# Patient Record
Sex: FEMALE | Race: WHITE | Employment: FULL TIME | URBAN - METROPOLITAN AREA
[De-identification: names, ages, dates, MRNs, and addresses within clinical notes are randomized per-mention and may not be internally consistent; named-entity substitution may affect disease eponyms.]

---

## 2017-02-15 ENCOUNTER — OFFICE VISIT (OUTPATIENT)
Dept: FAMILY MEDICINE | Facility: CLINIC | Age: 37
End: 2017-02-15
Payer: COMMERCIAL

## 2017-02-15 VITALS
BODY MASS INDEX: 29.71 KG/M2 | OXYGEN SATURATION: 95 % | DIASTOLIC BLOOD PRESSURE: 75 MMHG | WEIGHT: 174 LBS | HEIGHT: 64 IN | SYSTOLIC BLOOD PRESSURE: 102 MMHG | HEART RATE: 80 BPM

## 2017-02-15 DIAGNOSIS — N64.4 BREAST TENDERNESS: ICD-10-CM

## 2017-02-15 DIAGNOSIS — F98.8 ATTENTION DEFICIT DISORDER: Primary | ICD-10-CM

## 2017-02-15 DIAGNOSIS — L30.9 ECZEMA, UNSPECIFIED TYPE: ICD-10-CM

## 2017-02-15 PROCEDURE — 99203 OFFICE O/P NEW LOW 30 MIN: CPT | Performed by: INTERNAL MEDICINE

## 2017-02-15 RX ORDER — DEXTROAMPHETAMINE SACCHARATE, AMPHETAMINE ASPARTATE MONOHYDRATE, DEXTROAMPHETAMINE SULFATE AND AMPHETAMINE SULFATE 5; 5; 5; 5 MG/1; MG/1; MG/1; MG/1
20 CAPSULE, EXTENDED RELEASE ORAL DAILY
Qty: 30 CAPSULE | Refills: 0 | Status: SHIPPED | OUTPATIENT
Start: 2017-02-15 | End: 2017-03-28

## 2017-02-15 RX ORDER — DEXTROAMPHETAMINE SACCHARATE, AMPHETAMINE ASPARTATE, DEXTROAMPHETAMINE SULFATE AND AMPHETAMINE SULFATE 2.5; 2.5; 2.5; 2.5 MG/1; MG/1; MG/1; MG/1
TABLET ORAL
Qty: 30 TABLET | Refills: 0 | Status: SHIPPED | OUTPATIENT
Start: 2017-02-15 | End: 2017-03-28

## 2017-02-15 RX ORDER — NYSTATIN AND TRIAMCINOLONE ACETONIDE 100000; 1 [USP'U]/G; MG/G
CREAM TOPICAL 2 TIMES DAILY
Qty: 15 G | Refills: 1 | Status: SHIPPED | OUTPATIENT
Start: 2017-02-15 | End: 2017-06-06

## 2017-02-15 ASSESSMENT — ANXIETY QUESTIONNAIRES
6. BECOMING EASILY ANNOYED OR IRRITABLE: NOT AT ALL
1. FEELING NERVOUS, ANXIOUS, OR ON EDGE: NOT AT ALL
3. WORRYING TOO MUCH ABOUT DIFFERENT THINGS: NOT AT ALL
7. FEELING AFRAID AS IF SOMETHING AWFUL MIGHT HAPPEN: NOT AT ALL
5. BEING SO RESTLESS THAT IT IS HARD TO SIT STILL: NOT AT ALL
GAD7 TOTAL SCORE: 0
2. NOT BEING ABLE TO STOP OR CONTROL WORRYING: NOT AT ALL

## 2017-02-15 ASSESSMENT — ENCOUNTER SYMPTOMS
NERVOUS/ANXIOUS: 0
TREMORS: 0
PALPITATIONS: 0
COUGH: 0
SHORTNESS OF BREATH: 0

## 2017-02-15 ASSESSMENT — PATIENT HEALTH QUESTIONNAIRE - PHQ9: 5. POOR APPETITE OR OVEREATING: NOT AT ALL

## 2017-02-15 NOTE — LETTER
[facility]    02/15/17    Patient: Nicki Frey  YOB: 1980  Medical Record Number: [id number]                                                                  Controlled Substance Agreement  I understand that my care provider has prescribed controlled substances (narcotics, tranquilizers, and/or stimulants) to help manage my condition(s).  I am taking this medicine to help me function or work.  I know that this is strong medicine.  It could have serious side effects and even cause a dependency on the drug.  If I stop these medicines suddenly, I could have severe withdrawal symptoms.    The risks, benefits, and side effects of these medication(s) were explained to me.  I agree that:  1. I will take part in other treatments as advised by my provider.  This may be psychiatry or counseling, physical therapy, behavioral therapy, group treatment, or a referral to a pain clinic.  I will reduce or stop my medicine when my provider tells me to do so.   2. I will take my medicines as prescribed.  I will not change the dose or schedule unless my provider tells me to.  There will be no refills if I  run out early.   I may be contacted at any time without warning and asked to complete a drug test or pill count.   3. I will keep all my appointments at the clinic.  If I miss appointments or fail to follow instructions, my provider may stop my medicine.  4. I will not ask other providers to prescribe controlled substances. And I will not accept controlled substances from other people. If I need another prescribed controlled substance for a new reason, I will notify my provider within one business day.  5. If I enroll in the [place] Medical Marijuana program, I will tell my provider.  I will also sign an agreement to share my medical records with my provider.  6. I will use one pharmacy to fill all of my controlled substance prescriptions.  If my prescription is mailed to my pharmacy, it may take 5 to 7  days for my medicine to be ready.  7. I understand that my provider, clinic care team, and pharmacy can track controlled substance prescriptions from other providers through a central database (prescription monitoring program).  8. I will bring in my list of medications (or my medicine bottles) each time I come to the clinic.  REV-  04/2016                                                                                                                                            Page 1 of 2      [facility]    02/15/17    Patient: Nicki Frey  YOB: 1980  Medical Record Number: [id number]    9. Refills of controlled substances will be made only during office hours.  It is up to me to make sure that I do not run out of my medicines on weekends or holidays.    10. I am responsible for my prescriptions.  If the medicine is lost or stolen, it will not be replaced.   I also agree not to share these medicines with anyone.  11. I agree to not use ANY illegal or recreational drugs.  This includes marijuana, cocaine, bath salts or other drugs.  I agree not to use alcohol unless my provider says I may.  I agree to give urine samples whenever asked.  If I fail to give a urine sample, the provider may stop my medicine.     12. I will tell my nurse or provider right away if I become pregnant or have a new medical problem treated outside of [facility].  13. I understand that this medicine can affect my thinking and judgment.  It may be unsafe for me to drive, use machinery and do dangerous tasks.  I will not do any of these things until I know how the medicine affects me.  If my dose changes, I will wait to see how it affects me.  I will contact my provider if I have concerns about medicine side effects.  I understand that if I do not follow any of the conditions above, my prescriptions or treatment may be stopped.    I agree that my provider, clinic care team, and pharmacy may work with any city,  state or federal law enforcement agency that investigates the misuse, sale, or other diversion of my controlled medicine. I will allow my provider to discuss my care with or share a copy of this agreement with any other treating provider, pharmacy or emergency room where I receive care.  I agree to give up (waive) any right of privacy or confidentiality with respect to these authorizations.   I have read this agreement and have asked questions about anything I did not understand.   ___________________________________    ___________________________  Patient Signature                                                           Date and Time  ___________________________________     ____________________________  Witness                                                                            Date and Time  ___________________________________  Randall Walter Jason MD  REV-  04/2016                                                                                                                                                                 Page 2 of 2

## 2017-02-15 NOTE — MR AVS SNAPSHOT
"              After Visit Summary   2/15/2017    Nicki Frey    MRN: [id number]           Patient Information     Date Of Birth          1980        Visit Information        Provider Department      2/15/2017 3:30 PM Randall Jason MD [facility]        Today's Diagnoses     Attention deficit disorder    -  1    Eczema, unspecified type        Breast tenderness          Care Instructions    Call doctor if your skin problem persist/worsens, or if you develop new symptoms.    Proceed with mammogram.    Follow up in 1 month.          Follow-ups after your visit        Future tests that were ordered for you today     Open Future Orders        Priority Expected Expires Ordered    MA Diagnostic Digital Bilateral Routine  2/15/2018 2/15/2017            Who to contact     If you have questions or need follow up information about today's clinic visit or your schedule please contact [facility] directly at [phone].  Normal or non-critical lab and imaging results will be communicated to you by MyChart, letter or phone within 4 business days after the clinic has received the results. If you do not hear from us within 7 days, please contact the clinic through MyChart or phone. If you have a critical or abnormal lab result, we will notify you by phone as soon as possible.  Submit refill requests through [employer] or call your pharmacy and they will forward the refill request to us. Please allow 3 business days for your refill to be completed.          Additional Information About Your Visit        MyChart Information     [employer] lets you send messages to your doctor, view your test results, renew your prescriptions, schedule appointments and more. To sign up, go to [email or web address] . Click on \"Log in\" on the left side of the screen, which will take you to the Welcome page. Then click on \"Sign up Now\" on the right side of the page.     You will be asked to enter the " "access code listed below, as well as some personal information. Please follow the directions to create your username and password.     Your access code is: [email or web address]  Expires: 2017  4:30 PM     Your access code will  in 90 days. If you need help or a new code, please call your [facility] clinic or [phone].        Care EveryWhere ID     This is your Care EveryWhere ID. This could be used by other organizations to access your [facility] medical records  [id number]        Your Vitals Were     Pulse Height Last Period Pulse Oximetry Breastfeeding? BMI (Body Mass Index)    80 5' 4\" (1.626 m) 02/10/2017 95% No 29.87 kg/m2       Blood Pressure from Last 3 Encounters:   02/15/17 102/75    Weight from Last 3 Encounters:   02/15/17 174 lb (78.9 kg)                 Today's Medication Changes          These changes are accurate as of: 2/15/17  4:30 PM.  If you have any questions, ask your nurse or doctor.               Start taking these medicines.        Dose/Directions    * amphetamine-dextroamphetamine 20 MG per 24 hr capsule   Commonly known as:  ADDERALL XR   Used for:  Attention deficit disorder   Started by:  Randall Jason MD        Dose:  20 mg   Take 1 capsule (20 mg) by mouth daily   Quantity:  30 capsule   Refills:  0       * amphetamine-dextroamphetamine 10 MG per tablet   Commonly known as:  ADDERALL   Used for:  Attention deficit disorder   Started by:  Randall Jason MD        May take 1 tablet in the afternoon as needed   Quantity:  30 tablet   Refills:  0       nystatin-triamcinolone cream   Commonly known as:  MYCOLOG II   Used for:  Eczema, unspecified type   Started by:  Randall Jason MD        Apply topically 2 times daily Do not use continuously more than 1 week   Quantity:  15 g   Refills:  1       * Notice:  This list has 2 medication(s) that are the same as other medications prescribed for you. Read the directions carefully, and " ask your doctor or other care provider to review them with you.         Where to get your medicines      These medications were sent to [facility]/pharmacy #[id number] - [place], [place] - [address] AT [place] [address]  [address], [place] [address]     Phone:  [phone]     nystatin-triamcinolone cream         Some of these will need a paper prescription and others can be bought over the counter.  Ask your nurse if you have questions.     Bring a paper prescription for each of these medications     amphetamine-dextroamphetamine 10 MG per tablet    amphetamine-dextroamphetamine 20 MG per 24 hr capsule                Primary Care Provider Office Phone # Fax #    Randall Walter Jason [phone]       [facility] [address] [facility] [address]  [facility] [address]        Thank you!     Thank you for choosing [facility]  for your care. Our goal is always to provide you with excellent care. Hearing back from our patients is one way we can continue to improve our services. Please take a few minutes to complete the written survey that you may receive in the mail after your visit with us. Thank you!             Your Updated Medication List - Protect others around you: Learn how to safely use, store and throw away your medicines at www.disposemymeds.org.          This list is accurate as of: 2/15/17  4:30 PM.  Always use your most recent med list.                   Brand Name Dispense Instructions for use    * amphetamine-dextroamphetamine 20 MG per 24 hr capsule    ADDERALL XR    30 capsule    Take 1 capsule (20 mg) by mouth daily       * amphetamine-dextroamphetamine 10 MG per tablet    ADDERALL    30 tablet    May take 1 tablet in the afternoon as needed       nystatin-triamcinolone cream    MYCOLOG II    15 g    Apply topically 2 times daily Do not use continuously more than 1 week       * Notice:  This list has 2 medication(s) that are the same as other medications  prescribed for you. Read the directions carefully, and ask your doctor or other care provider to review them with you.

## 2017-02-15 NOTE — PROGRESS NOTES
"HPI Comments:   Patient comes in today to establish care and discuss certain concerns.      For the past couple of weeks, the patient has been noticing a scaly, mildly erythematous and mildly pruritic rash at the left breast which is recurrent and spontaneously resolves.  No associated pain.    Also reports tenderness at the left breast. Has not noticed any palpable masses. No nipple discharge.    She has a history of attention deficit disorder and used to be on a regimen of extended-release Adderall with as-needed regular-release Adderall late in the afternoon. She does not recall the doses of her Adderall.      Past Medical History   Diagnosis Date     Attention deficit disorder        Review of Systems   Constitutional: Negative for malaise/fatigue.   Respiratory: Negative for cough and shortness of breath.    Cardiovascular: Negative for chest pain and palpitations.   Neurological: Negative for tremors.   Psychiatric/Behavioral: The patient is not nervous/anxious.        /75  Pulse 80  Ht 5' 4\" (1.626 m)  Wt 174 lb (78.9 kg)  LMP 02/10/2017  SpO2 95%  Breastfeeding? No  BMI 29.87 kg/m2      Physical Exam   Constitutional: She is oriented to person, place, and time. No distress.   Neck: No thyromegaly present.   Cardiovascular: Normal rate, regular rhythm and normal heart sounds.    Pulmonary/Chest: Effort normal and breath sounds normal. No respiratory distress. Right breast exhibits no mass, no nipple discharge and no tenderness. Left breast exhibits tenderness (at lower half). Left breast exhibits no mass and no nipple discharge.   Lymphadenopathy:     She has no cervical adenopathy.        Right axillary: No pectoral and no lateral adenopathy present.        Left axillary: No pectoral and no lateral adenopathy present.       Right: No supraclavicular adenopathy present.        Left: No supraclavicular adenopathy present.   Neurological: She is alert and oriented to person, place, and time. GCS score is " 15.   Skin: Rash (Mildly erythematous and scaly patch at the left breast) noted.   Psychiatric: Mood and affect normal.   Vitals reviewed.        ICD-10-CM    1. Attention deficit disorder F98.8 amphetamine-dextroamphetamine (ADDERALL XR) 20 MG per 24 hr capsule     amphetamine-dextroamphetamine (ADDERALL) 10 MG per tablet   2. Eczema, unspecified type L30.9 nystatin-triamcinolone (MYCOLOG II) cream   3. Breast tenderness N64.4 MA Diagnostic Digital Bilateral         Patient Instructions   Call doctor if your skin problem persist/worsens, or if you develop new symptoms.    Proceed with mammogram.    Follow up in 1 month.

## 2017-02-15 NOTE — PATIENT INSTRUCTIONS
Call doctor if your skin problem persist/worsens, or if you develop new symptoms.    Proceed with mammogram.    Follow up in 1 month.

## 2017-02-16 ASSESSMENT — ANXIETY QUESTIONNAIRES: GAD7 TOTAL SCORE: 0

## 2017-02-28 ENCOUNTER — HOSPITAL ENCOUNTER (OUTPATIENT)
Dept: MAMMOGRAPHY | Facility: CLINIC | Age: 37
Discharge: HOME | End: 2017-02-28
Attending: INTERNAL MEDICINE | Admitting: INTERNAL MEDICINE
Payer: COMMERCIAL

## 2017-02-28 ENCOUNTER — HOSPITAL ENCOUNTER (OUTPATIENT)
Dept: MAMMOGRAPHY | Facility: CLINIC | Age: 37
End: 2017-02-28
Attending: INTERNAL MEDICINE
Payer: COMMERCIAL

## 2017-02-28 DIAGNOSIS — N64.4 BREAST TENDERNESS: ICD-10-CM

## 2017-02-28 PROCEDURE — 76642 ULTRASOUND BREAST LIMITED: CPT | Mod: LT

## 2017-02-28 PROCEDURE — G0204 DX MAMMO INCL CAD BI: HCPCS

## 2017-03-28 ENCOUNTER — OFFICE VISIT (OUTPATIENT)
Dept: FAMILY MEDICINE | Facility: CLINIC | Age: 37
End: 2017-03-28
Payer: COMMERCIAL

## 2017-03-28 VITALS
WEIGHT: 169.9 LBS | SYSTOLIC BLOOD PRESSURE: 112 MMHG | OXYGEN SATURATION: 97 % | HEART RATE: 68 BPM | TEMPERATURE: 98.9 F | HEIGHT: 64 IN | DIASTOLIC BLOOD PRESSURE: 73 MMHG | BODY MASS INDEX: 29.01 KG/M2

## 2017-03-28 DIAGNOSIS — F33.0 MILD EPISODE OF RECURRENT MAJOR DEPRESSIVE DISORDER (H): Primary | ICD-10-CM

## 2017-03-28 DIAGNOSIS — F98.8 ATTENTION DEFICIT DISORDER: ICD-10-CM

## 2017-03-28 PROCEDURE — 99214 OFFICE O/P EST MOD 30 MIN: CPT | Performed by: INTERNAL MEDICINE

## 2017-03-28 PROCEDURE — 84443 ASSAY THYROID STIM HORMONE: CPT | Performed by: INTERNAL MEDICINE

## 2017-03-28 PROCEDURE — 36415 COLL VENOUS BLD VENIPUNCTURE: CPT | Performed by: INTERNAL MEDICINE

## 2017-03-28 RX ORDER — CITALOPRAM HYDROBROMIDE 20 MG/1
20 TABLET ORAL DAILY
Qty: 30 TABLET | Refills: 1 | Status: SHIPPED | OUTPATIENT
Start: 2017-03-28 | End: 2018-02-13

## 2017-03-28 RX ORDER — ACETAMINOPHEN 500 MG
1000 TABLET ORAL
COMMUNITY
End: 2018-11-08

## 2017-03-28 RX ORDER — COVID-19 ANTIGEN TEST
KIT MISCELLANEOUS
COMMUNITY
End: 2022-03-04

## 2017-03-28 RX ORDER — DEXTROAMPHETAMINE SACCHARATE, AMPHETAMINE ASPARTATE MONOHYDRATE, DEXTROAMPHETAMINE SULFATE AND AMPHETAMINE SULFATE 5; 5; 5; 5 MG/1; MG/1; MG/1; MG/1
20 CAPSULE, EXTENDED RELEASE ORAL DAILY
Qty: 30 CAPSULE | Refills: 0 | Status: SHIPPED | OUTPATIENT
Start: 2017-03-28 | End: 2017-05-17

## 2017-03-28 RX ORDER — DEXTROAMPHETAMINE SACCHARATE, AMPHETAMINE ASPARTATE, DEXTROAMPHETAMINE SULFATE AND AMPHETAMINE SULFATE 2.5; 2.5; 2.5; 2.5 MG/1; MG/1; MG/1; MG/1
TABLET ORAL
Qty: 30 TABLET | Refills: 0 | Status: SHIPPED | OUTPATIENT
Start: 2017-03-28 | End: 2017-05-17

## 2017-03-28 RX ORDER — ESCITALOPRAM OXALATE 10 MG/1
10 TABLET ORAL DAILY
Qty: 30 TABLET | Refills: 1 | Status: SHIPPED | OUTPATIENT
Start: 2017-03-28 | End: 2018-02-13

## 2017-03-28 NOTE — NURSING NOTE
"Chief Complaint   Patient presents with     Recheck Medication     Adderall       Initial /73 (BP Location: Left arm, Patient Position: Chair, Cuff Size: Adult Regular)  Pulse 68  Temp 98.9  F (37.2  C) (Oral)  Ht 5' 4\" (1.626 m)  Wt 169 lb 14.4 oz (77.1 kg)  SpO2 97%  Breastfeeding? No  BMI 29.16 kg/m2 Estimated body mass index is 29.16 kg/(m^2) as calculated from the following:    Height as of this encounter: 5' 4\" (1.626 m).    Weight as of this encounter: 169 lb 14.4 oz (77.1 kg).  Medication Reconciliation: complete     LORETTA Ravi MA March 28, 2017 4:06 PM      "

## 2017-03-28 NOTE — MR AVS SNAPSHOT
"              After Visit Summary   3/28/2017    Nicki Frey    MRN: [id number]           Patient Information     Date Of Birth          1980        Visit Information        Provider Department      3/28/2017 4:00 PM Randall Jason MD [facility]        Today's Diagnoses     Mild episode of recurrent major depressive disorder (H)    -  1    Attention deficit disorder          Care Instructions    Start first with Celexa, and if not tolerated then may try Lexapro.    Call doctor if you develop any side effects from the medication.    Follow up in 1 month.          Follow-ups after your visit        Who to contact     If you have questions or need follow up information about today's clinic visit or your schedule please contact [facility] directly at [phone].  Normal or non-critical lab and imaging results will be communicated to you by MyChart, letter or phone within 4 business days after the clinic has received the results. If you do not hear from us within 7 days, please contact the clinic through MyChart or phone. If you have a critical or abnormal lab result, we will notify you by phone as soon as possible.  Submit refill requests through [employer] or call your pharmacy and they will forward the refill request to us. Please allow 3 business days for your refill to be completed.          Additional Information About Your Visit        [employer] Information     [employer] lets you send messages to your doctor, view your test results, renew your prescriptions, schedule appointments and more. To sign up, go to [email or web address] . Click on \"Log in\" on the left side of the screen, which will take you to the Welcome page. Then click on \"Sign up Now\" on the right side of the page.     You will be asked to enter the access code listed below, as well as some personal information. Please follow the directions to create your username and password.     Your access code is: " [email or web address]  Expires: 2017  5:30 PM     Your access code will  in 90 days. If you need help or a new code, please call your [facility] clinic or [phone].        Care EveryWhere ID     This is your Care EveryWhere ID. This could be used by other organizations to access your [facility] medical records  [id number]        Your Vitals Were     Pulse Temperature Height Pulse Oximetry Breastfeeding? BMI (Body Mass Index)    68 98.9  F (37.2  C) (Oral) 5' 4\" (1.626 m) 97% No 29.16 kg/m2       Blood Pressure from Last 3 Encounters:   17 112/73   02/15/17 102/75    Weight from Last 3 Encounters:   17 169 lb 14.4 oz (77.1 kg)   02/15/17 174 lb (78.9 kg)              Today, you had the following     No orders found for display         Today's Medication Changes          These changes are accurate as of: 3/28/17  4:33 PM.  If you have any questions, ask your nurse or doctor.               Start taking these medicines.        Dose/Directions    citalopram 20 MG tablet   Commonly known as:  celeXA   Used for:  Mild episode of recurrent major depressive disorder (H)   Started by:  Randall Jason MD        Dose:  20 mg   Take 1 tablet (20 mg) by mouth daily Take half a tablet daily for the first week   Quantity:  30 tablet   Refills:  1       escitalopram 10 MG tablet   Commonly known as:  LEXAPRO   Used for:  Mild episode of recurrent major depressive disorder (H)   Started by:  Randall Jason MD        Dose:  10 mg   Take 1 tablet (10 mg) by mouth daily Take half a tablet once a day for the first week   Quantity:  30 tablet   Refills:  1            Where to get your medicines      Some of these will need a paper prescription and others can be bought over the counter.  Ask your nurse if you have questions.     Bring a paper prescription for each of these medications     amphetamine-dextroamphetamine 10 MG per tablet    amphetamine-dextroamphetamine 20 MG per 24 hr " capsule    citalopram 20 MG tablet    escitalopram 10 MG tablet                Primary Care Provider Office Phone # Fax #    Randall Walter Jason [phone]       [facility] [address] [facility] [address]  [facility] [address]        Thank you!     Thank you for choosing [facility]  for your care. Our goal is always to provide you with excellent care. Hearing back from our patients is one way we can continue to improve our services. Please take a few minutes to complete the written survey that you may receive in the mail after your visit with us. Thank you!             Your Updated Medication List - Protect others around you: Learn how to safely use, store and throw away your medicines at www.disposemymeds.org.          This list is accurate as of: 3/28/17  4:33 PM.  Always use your most recent med list.                   Brand Name Dispense Instructions for use    acetaminophen 500 MG tablet    TYLENOL     Take 1,000 mg by mouth       * amphetamine-dextroamphetamine 20 MG per 24 hr capsule    ADDERALL XR    30 capsule    Take 1 capsule (20 mg) by mouth daily       * amphetamine-dextroamphetamine 10 MG per tablet    ADDERALL    30 tablet    May take 1 tablet in the afternoon as needed       citalopram 20 MG tablet    celeXA    30 tablet    Take 1 tablet (20 mg) by mouth daily Take half a tablet daily for the first week       escitalopram 10 MG tablet    LEXAPRO    30 tablet    Take 1 tablet (10 mg) by mouth daily Take half a tablet once a day for the first week       naproxen sodium 220 MG capsule          nystatin-triamcinolone cream    MYCOLOG II    15 g    Apply topically 2 times daily Do not use continuously more than 1 week       * Notice:  This list has 2 medication(s) that are the same as other medications prescribed for you. Read the directions carefully, and ask your doctor or other care provider to review them with you.

## 2017-03-28 NOTE — PATIENT INSTRUCTIONS
Start first with Celexa, and if not tolerated then may try Lexapro.    Call doctor if you develop any side effects from the medication.    Follow up in 1 month.

## 2017-03-29 LAB — TSH SERPL DL<=0.005 MIU/L-ACNC: 2.4 MU/L (ref 0.4–4)

## 2017-03-29 ASSESSMENT — ENCOUNTER SYMPTOMS
INSOMNIA: 0
BLURRED VISION: 0
MYALGIAS: 0
WEIGHT LOSS: 0
NERVOUS/ANXIOUS: 0
HEADACHES: 0
DEPRESSION: 1
ABDOMINAL PAIN: 0
TREMORS: 0
PALPITATIONS: 0
VOMITING: 0
NAUSEA: 0
HALLUCINATIONS: 0
DIZZINESS: 0
CONSTIPATION: 0
DIARRHEA: 0

## 2017-03-29 ASSESSMENT — PATIENT HEALTH QUESTIONNAIRE - PHQ9: SUM OF ALL RESPONSES TO PHQ QUESTIONS 1-9: 5

## 2017-03-29 ASSESSMENT — LIFESTYLE VARIABLES: SUBSTANCE_ABUSE: 0

## 2017-03-29 NOTE — PROGRESS NOTES
"HPI Comments:   I last saw the patient at the clinic on 2/15/2017.  During that clinic visit, we discussed about her attention deficit disorder, for which I started her on a regimen of extended release Adderall with a supplementary immediate release as-need Adderall dose in the afternoon.  She is doing well on this regimen and would like to continue on the same dose.    As a new concern, the patient has been experiencing worsening depression. This is being aggravated by several personal stressors: which include stress at work and significant life changes such as her daughter leaving the home to go to college, moving in with her fiancé.  She has observed that her depression is causing her to be disinterested in doing anything.  No thoughts of hurting herself or others.  Has had counseling in the past for depression but has not had any medical treatment.      Past Medical History:   Diagnosis Date     Attention deficit disorder        Review of Systems   Constitutional: Positive for malaise/fatigue. Negative for weight loss.   Eyes: Negative for blurred vision.   Cardiovascular: Negative for chest pain and palpitations.   Gastrointestinal: Negative for abdominal pain, constipation, diarrhea, nausea and vomiting.   Musculoskeletal: Negative for myalgias.   Neurological: Negative for dizziness, tremors and headaches.   Psychiatric/Behavioral: Positive for depression. Negative for hallucinations, substance abuse and suicidal ideas. The patient is not nervous/anxious and does not have insomnia.        /73 (BP Location: Left arm, Patient Position: Chair, Cuff Size: Adult Regular)  Pulse 68  Temp 98.9  F (37.2  C) (Oral)  Ht 5' 4\" (1.626 m)  Wt 169 lb 14.4 oz (77.1 kg)  SpO2 97%  Breastfeeding? No  BMI 29.16 kg/m2      Physical Exam   Constitutional: She is oriented to person, place, and time. No distress.   Neck: No thyromegaly present.   Cardiovascular: Normal rate, regular rhythm and normal heart sounds.  "   Neurological: She is alert and oriented to person, place, and time. GCS score is 15.   Psychiatric: Affect normal.   Depressed mood, mildly tearful   Vitals reviewed.        ICD-10-CM    1. Mild episode of recurrent major depressive disorder (H) F33.0 escitalopram (LEXAPRO) 10 MG tablet     citalopram (CELEXA) 20 MG tablet     TSH with free T4 reflex   2. Attention deficit disorder F98.8 amphetamine-dextroamphetamine (ADDERALL XR) 20 MG per 24 hr capsule     amphetamine-dextroamphetamine (ADDERALL) 10 MG per tablet       Patient Instructions   Start first with Celexa, and if not tolerated then may try Lexapro.    Call doctor if you develop any side effects from the medication.    Follow up in 1 month.      *25 minutes was spent with the patient, more than half of which was spent on counseling on Depression

## 2017-05-17 ENCOUNTER — MYC MEDICAL ADVICE (OUTPATIENT)
Dept: FAMILY MEDICINE | Facility: CLINIC | Age: 37
End: 2017-05-17

## 2017-05-17 DIAGNOSIS — F98.8 ATTENTION DEFICIT DISORDER: ICD-10-CM

## 2017-05-17 RX ORDER — DEXTROAMPHETAMINE SACCHARATE, AMPHETAMINE ASPARTATE MONOHYDRATE, DEXTROAMPHETAMINE SULFATE AND AMPHETAMINE SULFATE 5; 5; 5; 5 MG/1; MG/1; MG/1; MG/1
20 CAPSULE, EXTENDED RELEASE ORAL DAILY
Qty: 30 CAPSULE | Refills: 0 | Status: SHIPPED | OUTPATIENT
Start: 2017-05-17 | End: 2017-06-06

## 2017-05-17 RX ORDER — DEXTROAMPHETAMINE SACCHARATE, AMPHETAMINE ASPARTATE, DEXTROAMPHETAMINE SULFATE AND AMPHETAMINE SULFATE 2.5; 2.5; 2.5; 2.5 MG/1; MG/1; MG/1; MG/1
TABLET ORAL
Qty: 30 TABLET | Refills: 0 | Status: SHIPPED | OUTPATIENT
Start: 2017-05-17 | End: 2017-06-06

## 2017-05-17 NOTE — TELEPHONE ENCOUNTER
Printed Rx is at the Rx-to-fax bin.  She is overdue for her 1-month follow up to re-assess her depression.  Please schedule clinic visit.

## 2017-05-17 NOTE — TELEPHONE ENCOUNTER
Disp Refills Start End LONDON   amphetamine-dextroamphetamine (ADDERALL XR) 20 MG per 24 hr capsule 30 capsule 0 3/28/2017  No   Sig: Take 1 capsule (20 mg) by mouth daily   Class: Local Print   Route: Oral   Order: [id number]   Printout Tracking   External Result Report   Pharmacy   [facility] #[id number] - [place] - [address] AT [place] [address]   Associated Diagnoses   Attention deficit disorder [F98.8]         amphetamine-dextroamphetamine (ADDERALL) 10 MG per tablet 30 tablet 0 3/28/2017  No   Sig: May take 1 tablet in the afternoon as needed   Class: Local Print   Order: [id number]   Printout Tracking   External Result Report   Medication Administration Instructions   May take 1 tablet in the afternoon as needed   Pharmacy   [facility] #[id number] - [place] - [address] AT [place] [address]   Associated Diagnoses   Attention deficit disorder [F98.8]         Controlled Substance Refill Request for Adderall    Last refill: 3/28/17    Last clinic visit: 3/28/17     Clinic visit frequency required: unknown  Next appt: none    Controlled substance agreement on file: Yes:  Date 2/15/17.    Documentation in problem list reviewed:  Yes    Processing:  Patient will  in clinic    Please refill if appropriate.  Janie Rivera RN

## 2017-05-18 ENCOUNTER — TELEPHONE (OUTPATIENT)
Dept: FAMILY MEDICINE | Facility: CLINIC | Age: 37
End: 2017-05-18

## 2017-06-06 ENCOUNTER — OFFICE VISIT (OUTPATIENT)
Dept: FAMILY MEDICINE | Facility: CLINIC | Age: 37
End: 2017-06-06

## 2017-06-06 VITALS
HEART RATE: 70 BPM | DIASTOLIC BLOOD PRESSURE: 80 MMHG | WEIGHT: 159 LBS | HEIGHT: 64 IN | SYSTOLIC BLOOD PRESSURE: 122 MMHG | TEMPERATURE: 98.2 F | BODY MASS INDEX: 27.14 KG/M2 | OXYGEN SATURATION: 98 %

## 2017-06-06 DIAGNOSIS — R53.83 FATIGUE, UNSPECIFIED TYPE: ICD-10-CM

## 2017-06-06 DIAGNOSIS — F98.8 ATTENTION DEFICIT DISORDER: ICD-10-CM

## 2017-06-06 DIAGNOSIS — F33.0 MILD EPISODE OF RECURRENT MAJOR DEPRESSIVE DISORDER (H): Primary | ICD-10-CM

## 2017-06-06 PROCEDURE — 36415 COLL VENOUS BLD VENIPUNCTURE: CPT | Performed by: INTERNAL MEDICINE

## 2017-06-06 PROCEDURE — 84481 FREE ASSAY (FT-3): CPT | Performed by: INTERNAL MEDICINE

## 2017-06-06 PROCEDURE — 99213 OFFICE O/P EST LOW 20 MIN: CPT | Performed by: INTERNAL MEDICINE

## 2017-06-06 PROCEDURE — 84439 ASSAY OF FREE THYROXINE: CPT | Performed by: INTERNAL MEDICINE

## 2017-06-06 PROCEDURE — 84443 ASSAY THYROID STIM HORMONE: CPT | Performed by: INTERNAL MEDICINE

## 2017-06-06 PROCEDURE — 82533 TOTAL CORTISOL: CPT | Performed by: INTERNAL MEDICINE

## 2017-06-06 RX ORDER — DEXTROAMPHETAMINE SACCHARATE, AMPHETAMINE ASPARTATE MONOHYDRATE, DEXTROAMPHETAMINE SULFATE AND AMPHETAMINE SULFATE 5; 5; 5; 5 MG/1; MG/1; MG/1; MG/1
20 CAPSULE, EXTENDED RELEASE ORAL DAILY
Qty: 30 CAPSULE | Refills: 0 | Status: SHIPPED | OUTPATIENT
Start: 2017-07-06 | End: 2017-06-06

## 2017-06-06 RX ORDER — DEXTROAMPHETAMINE SACCHARATE, AMPHETAMINE ASPARTATE, DEXTROAMPHETAMINE SULFATE AND AMPHETAMINE SULFATE 2.5; 2.5; 2.5; 2.5 MG/1; MG/1; MG/1; MG/1
TABLET ORAL
Qty: 30 TABLET | Refills: 0 | Status: SHIPPED | OUTPATIENT
Start: 2017-06-06 | End: 2017-06-06

## 2017-06-06 RX ORDER — DEXTROAMPHETAMINE SACCHARATE, AMPHETAMINE ASPARTATE MONOHYDRATE, DEXTROAMPHETAMINE SULFATE AND AMPHETAMINE SULFATE 5; 5; 5; 5 MG/1; MG/1; MG/1; MG/1
20 CAPSULE, EXTENDED RELEASE ORAL DAILY
Qty: 30 CAPSULE | Refills: 0 | Status: SHIPPED | OUTPATIENT
Start: 2017-08-06 | End: 2018-02-13

## 2017-06-06 RX ORDER — DEXTROAMPHETAMINE SACCHARATE, AMPHETAMINE ASPARTATE MONOHYDRATE, DEXTROAMPHETAMINE SULFATE AND AMPHETAMINE SULFATE 5; 5; 5; 5 MG/1; MG/1; MG/1; MG/1
20 CAPSULE, EXTENDED RELEASE ORAL DAILY
Qty: 30 CAPSULE | Refills: 0 | Status: SHIPPED | OUTPATIENT
Start: 2017-06-06 | End: 2017-06-06

## 2017-06-06 RX ORDER — DEXTROAMPHETAMINE SACCHARATE, AMPHETAMINE ASPARTATE, DEXTROAMPHETAMINE SULFATE AND AMPHETAMINE SULFATE 2.5; 2.5; 2.5; 2.5 MG/1; MG/1; MG/1; MG/1
TABLET ORAL
Qty: 30 TABLET | Refills: 0 | Status: SHIPPED | OUTPATIENT
Start: 2017-08-06 | End: 2018-02-13

## 2017-06-06 RX ORDER — DEXTROAMPHETAMINE SACCHARATE, AMPHETAMINE ASPARTATE, DEXTROAMPHETAMINE SULFATE AND AMPHETAMINE SULFATE 2.5; 2.5; 2.5; 2.5 MG/1; MG/1; MG/1; MG/1
TABLET ORAL
Qty: 30 TABLET | Refills: 0 | Status: SHIPPED | OUTPATIENT
Start: 2017-07-06 | End: 2017-06-06

## 2017-06-06 ASSESSMENT — ANXIETY QUESTIONNAIRES
3. WORRYING TOO MUCH ABOUT DIFFERENT THINGS: SEVERAL DAYS
7. FEELING AFRAID AS IF SOMETHING AWFUL MIGHT HAPPEN: NOT AT ALL
2. NOT BEING ABLE TO STOP OR CONTROL WORRYING: SEVERAL DAYS
GAD7 TOTAL SCORE: 2
5. BEING SO RESTLESS THAT IT IS HARD TO SIT STILL: NOT AT ALL
6. BECOMING EASILY ANNOYED OR IRRITABLE: NOT AT ALL
1. FEELING NERVOUS, ANXIOUS, OR ON EDGE: NOT AT ALL
IF YOU CHECKED OFF ANY PROBLEMS ON THIS QUESTIONNAIRE, HOW DIFFICULT HAVE THESE PROBLEMS MADE IT FOR YOU TO DO YOUR WORK, TAKE CARE OF THINGS AT HOME, OR GET ALONG WITH OTHER PEOPLE: NOT DIFFICULT AT ALL

## 2017-06-06 ASSESSMENT — PATIENT HEALTH QUESTIONNAIRE - PHQ9: 5. POOR APPETITE OR OVEREATING: NOT AT ALL

## 2017-06-06 ASSESSMENT — ENCOUNTER SYMPTOMS
NERVOUS/ANXIOUS: 1
INSOMNIA: 0
DEPRESSION: 1

## 2017-06-06 NOTE — MR AVS SNAPSHOT
After Visit Summary   6/6/2017    Nicki Frey    MRN: [id number]           Patient Information     Date Of Birth          1980        Visit Information        Provider Department      6/6/2017 4:00 PM Randall Jason MD [facility]        Today's Diagnoses     Fatigue, unspecified type    -  1    Attention deficit disorder          Care Instructions    Continue with Celexa and inform doctor if your depression/anxiety persists/worsens, or if you develop new symptoms or side effects from the medication.    Keep in touch through My Chart.    Follow up in 2-3 months.            Follow-ups after your visit        Who to contact     If you have questions or need follow up information about today's clinic visit or your schedule please contact [facility] directly at [phone].  Normal or non-critical lab and imaging results will be communicated to you by MyChart, letter or phone within 4 business days after the clinic has received the results. If you do not hear from us within 7 days, please contact the clinic through MyChart or phone. If you have a critical or abnormal lab result, we will notify you by phone as soon as possible.  Submit refill requests through [employer] or call your pharmacy and they will forward the refill request to us. Please allow 3 business days for your refill to be completed.          Additional Information About Your Visit        MyChart Information     [employer] gives you secure access to your electronic health record. If you see a primary care provider, you can also send messages to your care team and make appointments. If you have questions, please call your primary care clinic.  If you do not have a primary care provider, please call [phone] and they will assist you.        Care EveryWhere ID     This is your Care EveryWhere ID. This could be used by other organizations to access your [facility] medical records  [id number]       "  Your Vitals Were     Pulse Temperature Height Last Period Pulse Oximetry Breastfeeding?    70 98.2  F (36.8  C) 5' 4\" (1.626 m) 06/05/2017 98% No    BMI (Body Mass Index)                   27.29 kg/m2            Blood Pressure from Last 3 Encounters:   06/06/17 122/80   03/28/17 112/73   02/15/17 102/75    Weight from Last 3 Encounters:   06/06/17 159 lb (72.1 kg)   03/28/17 169 lb 14.4 oz (77.1 kg)   02/15/17 174 lb (78.9 kg)              We Performed the Following     Cortisol     T3, Free     T4, free     TSH          Today's Medication Changes          These changes are accurate as of: 6/6/17  4:57 PM.  If you have any questions, ask your nurse or doctor.               Start taking these medicines.        Dose/Directions    * amphetamine-dextroamphetamine 20 MG per 24 hr capsule   Commonly known as:  ADDERALL XR   Used for:  Attention deficit disorder   Started by:  Randall Jason MD        Dose:  20 mg   Start taking on:  8/6/2017   Take 1 capsule (20 mg) by mouth daily   Quantity:  30 capsule   Refills:  0       * amphetamine-dextroamphetamine 10 MG per tablet   Commonly known as:  ADDERALL   Used for:  Attention deficit disorder   Started by:  Randall Jason MD        Start taking on:  8/6/2017   May take 1 tablet in the afternoon as needed   Quantity:  30 tablet   Refills:  0       * Notice:  This list has 2 medication(s) that are the same as other medications prescribed for you. Read the directions carefully, and ask your doctor or other care provider to review them with you.      Stop taking these medicines if you haven't already. Please contact your care team if you have questions.     nystatin-triamcinolone cream   Commonly known as:  MYCOLOG II   Stopped by:  Randall Jason MD                Where to get your medicines      Some of these will need a paper prescription and others can be bought over the counter.  Ask your nurse if you have " questions.     Bring a paper prescription for each of these medications     amphetamine-dextroamphetamine 10 MG per tablet    amphetamine-dextroamphetamine 20 MG per 24 hr capsule                Primary Care Provider Office Phone # Fax #    Randall Walter Jason [phone]       [facility] [address]  [facility] [address]        Thank you!     Thank you for choosing [facility]  for your care. Our goal is always to provide you with excellent care. Hearing back from our patients is one way we can continue to improve our services. Please take a few minutes to complete the written survey that you may receive in the mail after your visit with us. Thank you!             Your Updated Medication List - Protect others around you: Learn how to safely use, store and throw away your medicines at www.disposemymeds.org.          This list is accurate as of: 6/6/17  4:57 PM.  Always use your most recent med list.                   Brand Name Dispense Instructions for use    acetaminophen 500 MG tablet    TYLENOL     Take 1,000 mg by mouth       * amphetamine-dextroamphetamine 20 MG per 24 hr capsule   Start taking on:  8/6/2017    ADDERALL XR    30 capsule    Take 1 capsule (20 mg) by mouth daily       * amphetamine-dextroamphetamine 10 MG per tablet   Start taking on:  8/6/2017    ADDERALL    30 tablet    May take 1 tablet in the afternoon as needed       citalopram 20 MG tablet    celeXA    30 tablet    Take 1 tablet (20 mg) by mouth daily Take half a tablet daily for the first week       escitalopram 10 MG tablet    LEXAPRO    30 tablet    Take 1 tablet (10 mg) by mouth daily Take half a tablet once a day for the first week       naproxen sodium 220 MG capsule          * Notice:  This list has 2 medication(s) that are the same as other medications prescribed for you. Read the directions carefully, and ask your doctor or other care provider to review them with you.

## 2017-06-06 NOTE — PATIENT INSTRUCTIONS
Continue with Celexa and inform doctor if your depression/anxiety persists/worsens, or if you develop new symptoms or side effects from the medication.    Keep in touch through My Chart.    Follow up in 2-3 months.

## 2017-06-07 LAB
CORTIS SERPL-MCNC: 6.9 UG/DL (ref 4–22)
T3FREE SERPL-MCNC: 2.3 PG/ML (ref 2.3–4.2)
T4 FREE SERPL-MCNC: 0.89 NG/DL (ref 0.76–1.46)
TSH SERPL DL<=0.05 MIU/L-ACNC: 1.36 MU/L (ref 0.4–4)

## 2017-06-07 ASSESSMENT — ANXIETY QUESTIONNAIRES: GAD7 TOTAL SCORE: 2

## 2017-06-07 ASSESSMENT — PATIENT HEALTH QUESTIONNAIRE - PHQ9: SUM OF ALL RESPONSES TO PHQ QUESTIONS 1-9: 3

## 2018-02-13 ENCOUNTER — OFFICE VISIT (OUTPATIENT)
Dept: FAMILY MEDICINE | Facility: CLINIC | Age: 38
End: 2018-02-13
Payer: COMMERCIAL

## 2018-02-13 VITALS
OXYGEN SATURATION: 97 % | HEIGHT: 65 IN | TEMPERATURE: 99.3 F | WEIGHT: 158.4 LBS | DIASTOLIC BLOOD PRESSURE: 73 MMHG | BODY MASS INDEX: 26.39 KG/M2 | HEART RATE: 80 BPM | SYSTOLIC BLOOD PRESSURE: 106 MMHG

## 2018-02-13 DIAGNOSIS — F98.8 ATTENTION DEFICIT DISORDER (ADD) WITHOUT HYPERACTIVITY: ICD-10-CM

## 2018-02-13 DIAGNOSIS — Z23 NEED FOR PROPHYLACTIC VACCINATION AND INOCULATION AGAINST INFLUENZA: ICD-10-CM

## 2018-02-13 DIAGNOSIS — F33.0 MILD EPISODE OF RECURRENT MAJOR DEPRESSIVE DISORDER (H): Primary | ICD-10-CM

## 2018-02-13 PROCEDURE — 90471 IMMUNIZATION ADMIN: CPT | Performed by: INTERNAL MEDICINE

## 2018-02-13 PROCEDURE — 99214 OFFICE O/P EST MOD 30 MIN: CPT | Performed by: INTERNAL MEDICINE

## 2018-02-13 PROCEDURE — 90686 IIV4 VACC NO PRSV 0.5 ML IM: CPT | Performed by: INTERNAL MEDICINE

## 2018-02-13 RX ORDER — DEXTROAMPHETAMINE SACCHARATE, AMPHETAMINE ASPARTATE MONOHYDRATE, DEXTROAMPHETAMINE SULFATE AND AMPHETAMINE SULFATE 5; 5; 5; 5 MG/1; MG/1; MG/1; MG/1
20 CAPSULE, EXTENDED RELEASE ORAL DAILY
Qty: 30 CAPSULE | Refills: 0 | Status: SHIPPED | OUTPATIENT
Start: 2018-02-13 | End: 2018-04-05 | Stop reason: DRUGHIGH

## 2018-02-13 RX ORDER — DEXTROAMPHETAMINE SACCHARATE, AMPHETAMINE ASPARTATE, DEXTROAMPHETAMINE SULFATE AND AMPHETAMINE SULFATE 2.5; 2.5; 2.5; 2.5 MG/1; MG/1; MG/1; MG/1
TABLET ORAL
Qty: 30 TABLET | Refills: 0 | Status: SHIPPED | OUTPATIENT
Start: 2018-02-13 | End: 2018-04-05

## 2018-02-13 ASSESSMENT — ENCOUNTER SYMPTOMS
CONSTIPATION: 0
DEPRESSION: 1
TREMORS: 0
NERVOUS/ANXIOUS: 1
HEADACHES: 0
INSOMNIA: 0
ABDOMINAL PAIN: 0
DIZZINESS: 0
BLURRED VISION: 0
MYALGIAS: 0
NAUSEA: 0
DIARRHEA: 0
PALPITATIONS: 0
HALLUCINATIONS: 0
WEIGHT LOSS: 0
VOMITING: 0

## 2018-02-13 ASSESSMENT — LIFESTYLE VARIABLES: SUBSTANCE_ABUSE: 0

## 2018-02-13 NOTE — PATIENT INSTRUCTIONS
Call doctor if your attention deficit or depression persist/worsens, or if you develop new symptoms or side effects from the medication.

## 2018-02-13 NOTE — PROGRESS NOTES
Injectable Influenza Immunization Documentation    1.  Is the person to be vaccinated sick today?   No    2. Does the person to be vaccinated have an allergy to a component   of the vaccine?   No  Egg Allergy Algorithm Link    3. Has the person to be vaccinated ever had a serious reaction   to influenza vaccine in the past?   No    4. Has the person to be vaccinated ever had Guillain-Barré syndrome?   No    Form completed by patient  Sherri Uriostegui MA  Prior to injection verified patient identity using patient's name and date of birth.

## 2018-02-13 NOTE — NURSING NOTE
"Chief Complaint   Patient presents with     Recheck Medication       Initial /73 (BP Location: Left arm, Cuff Size: Adult Large)  Pulse 80  Temp 99.3  F (37.4  C) (Oral)  Ht 5' 4.5\" (1.638 m)  Wt 158 lb 6.4 oz (71.8 kg)  LMP 02/12/2018 (Exact Date)  SpO2 97%  BMI 26.77 kg/m2 Estimated body mass index is 26.77 kg/(m^2) as calculated from the following:    Height as of this encounter: 5' 4.5\" (1.638 m).    Weight as of this encounter: 158 lb 6.4 oz (71.8 kg).  Medication Reconciliation: complete   Sherri Uriostegui MA  "

## 2018-02-13 NOTE — MR AVS SNAPSHOT
After Visit Summary   2/13/2018    Nicki Frey    MRN: [id number]           Patient Information     Date Of Birth          1980        Visit Information        Provider Department      2/13/2018 3:00 PM Randall Jason MD [facility]        Today's Diagnoses     Attention deficit disorder, unspecified hyperactivity presence        Attention deficit disorder (ADD) without hyperactivity          Care Instructions    Call doctor if your attention deficit or depression persist/worsens, or if you develop new symptoms or side effects from the medication.            Follow-ups after your visit        Who to contact     If you have questions or need follow up information about today's clinic visit or your schedule please contact [facility] directly at [phone].  Normal or non-critical lab and imaging results will be communicated to you by MyChart, letter or phone within 4 business days after the clinic has received the results. If you do not hear from us within 7 days, please contact the clinic through [employer] or phone. If you have a critical or abnormal lab result, we will notify you by phone as soon as possible.  Submit refill requests through [employer] ???? or call your pharmacy and they will forward the refill request to us. Please allow 3 business days for your refill to be completed.          Additional Information About Your Visit        MyChart Information     [employer] gives you secure access to your electronic health record. If you see a primary care provider, you can also send messages to your care team and make appointments. If you have questions, please call your primary care clinic.  If you do not have a primary care provider, please call [phone] and they will assist you.        Care EveryWhere ID     This is your Care EveryWhere ID. This could be used by other organizations to access your [facility] medical records  [id number]        Your Vitals  "Were     Pulse Temperature Height Last Period Pulse Oximetry BMI (Body Mass Index)    80 99.3  F (37.4  C) (Oral) 5' 4.5\" (1.638 m) 02/12/2018 (Exact Date) 97% 26.77 kg/m2       Blood Pressure from Last 3 Encounters:   02/13/18 106/73   06/06/17 122/80   03/28/17 112/73    Weight from Last 3 Encounters:   02/13/18 158 lb 6.4 oz (71.8 kg)   06/06/17 159 lb (72.1 kg)   03/28/17 169 lb 14.4 oz (77.1 kg)              Today, you had the following     No orders found for display         Where to get your medicines      Some of these will need a paper prescription and others can be bought over the counter.  Ask your nurse if you have questions.     Bring a paper prescription for each of these medications     amphetamine-dextroamphetamine 10 MG per tablet    amphetamine-dextroamphetamine 20 MG per 24 hr capsule          Primary Care Provider Office Phone # Fax #    Randall Walter Jason [phone] [address] [address]        Equal Access to Services     [facility] AH: Hadii aad ku hadasho Soyuvalali, waaxda luqadaha, qaybta kaalmada adeegyada, bridget reddy . So [facility] [phone].    ATENCIÓN: Si habla español, tiene a bergman disposición servicios gratuitos de asistencia lingüística. Llame al [phone].    We comply with applicable federal civil rights laws and [place] laws. We do not discriminate on the basis of race, color, national origin, age, disability, sex, sexual orientation, or gender identity.            Thank you!     Thank you for choosing [facility]  for your care. Our goal is always to provide you with excellent care. Hearing back from our patients is one way we can continue to improve our services. Please take a few minutes to complete the written survey that you may receive in the mail after your visit with us. Thank you!             Your Updated Medication List - Protect others around you: Learn how to safely use, " store and throw away your medicines at www.disposemymeds.org.          This list is accurate as of 2/13/18  3:37 PM.  Always use your most recent med list.                   Brand Name Dispense Instructions for use Diagnosis    acetaminophen 500 MG tablet    TYLENOL     Take 1,000 mg by mouth        * amphetamine-dextroamphetamine 20 MG per 24 hr capsule    ADDERALL XR    30 capsule    Take 1 capsule (20 mg) by mouth daily    Attention deficit disorder, unspecified hyperactivity presence       * amphetamine-dextroamphetamine 10 MG per tablet    ADDERALL    30 tablet    May take 1 tablet in the afternoon as needed    Attention deficit disorder, unspecified hyperactivity presence       naproxen sodium 220 MG capsule           * Notice:  This list has 2 medication(s) that are the same as other medications prescribed for you. Read the directions carefully, and ask your doctor or other care provider to review them with you.

## 2018-02-13 NOTE — PROGRESS NOTES
"HPI    SUBJECTIVE:   Nicki Frey is a 37 year old female who presents to clinic today for the following health issues:      I last saw the patient at the clinic on 6/6/2017 for her depression and attention deficit disorder.  She lost her health insurance so she was not able to follow-up and ran out of her Adderall.  She would like to restart the medication.  I previously prescribed her with Celexa, but the patient feels that the medication was not helpful.  She is scheduled to meet with a counselor.  Her PHQ9 score today is less than 10.      Past Medical History:   Diagnosis Date     Attention deficit disorder        Review of Systems   Constitutional: Negative for malaise/fatigue and weight loss.   Eyes: Negative for blurred vision.   Cardiovascular: Negative for chest pain and palpitations.   Gastrointestinal: Negative for abdominal pain, constipation, diarrhea, nausea and vomiting.   Musculoskeletal: Negative for myalgias.   Neurological: Negative for dizziness, tremors and headaches.   Psychiatric/Behavioral: Positive for depression (mild; circumstantial due to breakup with a long-term relationship). Negative for hallucinations, substance abuse and suicidal ideas. The patient is nervous/anxious. The patient does not have insomnia.        /73 (BP Location: Left arm, Cuff Size: Adult Large)  Pulse 80  Temp 99.3  F (37.4  C) (Oral)  Ht 5' 4.5\" (1.638 m)  Wt 158 lb 6.4 oz (71.8 kg)  LMP 02/12/2018 (Exact Date)  SpO2 97%  BMI 26.77 kg/m2      Physical Exam   Constitutional: She is oriented to person, place, and time. No distress.   Neck: No thyromegaly present.   Cardiovascular: Normal rate, regular rhythm and normal heart sounds.    Neurological: She is alert and oriented to person, place, and time. GCS score is 15.   Psychiatric: Affect normal.   (+) anxious   Vitals reviewed.        ICD-10-CM    1. Mild episode of recurrent major depressive disorder (H) F33.0  doing fairly well without " medications; agree with her plan to meet with a counselor   2. Attention deficit disorder (ADD) without hyperactivity F98.8 amphetamine-dextroamphetamine (ADDERALL XR) 20 MG per 24 hr capsule     amphetamine-dextroamphetamine (ADDERALL) 10 MG per tablet     **please refer to HPI for status of conditions      Patient Instructions   Call doctor if your attention deficit or depression persist/worsens, or if you develop new symptoms or side effects from the medication.

## 2018-02-14 ASSESSMENT — PATIENT HEALTH QUESTIONNAIRE - PHQ9: SUM OF ALL RESPONSES TO PHQ QUESTIONS 1-9: 5

## 2018-03-19 ENCOUNTER — MYC REFILL (OUTPATIENT)
Dept: FAMILY MEDICINE | Facility: CLINIC | Age: 38
End: 2018-03-19

## 2018-03-19 ENCOUNTER — MYC MEDICAL ADVICE (OUTPATIENT)
Dept: FAMILY MEDICINE | Facility: CLINIC | Age: 38
End: 2018-03-19

## 2018-03-19 DIAGNOSIS — F98.8 ATTENTION DEFICIT DISORDER (ADD) WITHOUT HYPERACTIVITY: ICD-10-CM

## 2018-03-19 NOTE — TELEPHONE ENCOUNTER
Message from [employer]:  Original authorizing provider: MD Nicki Cloeman would like a refill of the following medications:  amphetamine-dextroamphetamine (ADDERALL XR) 20 MG per 24 hr capsule [Randall Jason MD]  amphetamine-dextroamphetamine (ADDERALL) 10 MG per tablet [Randall Jason MD]    Preferred pharmacy: [facility] - [facility] [address] AT [facility]    Comment:  I also sent and requested via email - I am thinking the 20MG could be stronger at this time. Can this be updates and sent to the pharmacy below? [facility] Address: [address], [place] [address] Phone: [phone] Nicki Frey [phone].

## 2018-03-19 NOTE — TELEPHONE ENCOUNTER
Please schedule a telephone visit to discuss how we will need to increase her Adderall dose based on her current symptoms

## 2018-03-19 NOTE — TELEPHONE ENCOUNTER
Dr. Jason,     Please see [employer] message below.   Patient has already sent an additional medication refill request through [employer].   Patient requesting stronger dose in message below.\    Please advise.     Desi Phillips RN

## 2018-03-20 RX ORDER — DEXTROAMPHETAMINE SACCHARATE, AMPHETAMINE ASPARTATE, DEXTROAMPHETAMINE SULFATE AND AMPHETAMINE SULFATE 2.5; 2.5; 2.5; 2.5 MG/1; MG/1; MG/1; MG/1
TABLET ORAL
Qty: 30 TABLET | Refills: 0 | OUTPATIENT
Start: 2018-03-20

## 2018-03-20 RX ORDER — DEXTROAMPHETAMINE SACCHARATE, AMPHETAMINE ASPARTATE MONOHYDRATE, DEXTROAMPHETAMINE SULFATE AND AMPHETAMINE SULFATE 5; 5; 5; 5 MG/1; MG/1; MG/1; MG/1
20 CAPSULE, EXTENDED RELEASE ORAL DAILY
Qty: 30 CAPSULE | Refills: 0 | OUTPATIENT
Start: 2018-03-20

## 2018-03-20 NOTE — TELEPHONE ENCOUNTER
Adderall      Last Written Prescription Date:  2/13/2018  Last Fill Quantity: 30,   # refills: 0  Last Office Visit: 2/13/2018  Future Office visit:       Routing refill request to provider for review/approval because:  Drug not on the [facility], [facility] or [facility] refill protocol or controlled substance

## 2018-04-05 ENCOUNTER — TELEPHONE (OUTPATIENT)
Dept: FAMILY MEDICINE | Facility: CLINIC | Age: 38
End: 2018-04-05

## 2018-04-05 DIAGNOSIS — F98.8 ATTENTION DEFICIT DISORDER (ADD) WITHOUT HYPERACTIVITY: ICD-10-CM

## 2018-04-05 RX ORDER — DEXTROAMPHETAMINE SACCHARATE, AMPHETAMINE ASPARTATE, DEXTROAMPHETAMINE SULFATE AND AMPHETAMINE SULFATE 2.5; 2.5; 2.5; 2.5 MG/1; MG/1; MG/1; MG/1
TABLET ORAL
Qty: 30 TABLET | Refills: 0 | Status: CANCELLED | OUTPATIENT
Start: 2018-04-05

## 2018-04-05 NOTE — TELEPHONE ENCOUNTER
Left message for patient to call back-also sent a [employer] message to patient.  Her Rxs for Adderall are ready. Patient can either pick them up or we can mail them to the pharmacy of her choice.  Dr. Jason wants her to follow up in the office in one month.  Rxs left on [employer] desk.  Ame Esparza [facility]

## 2018-05-21 ENCOUNTER — MYC REFILL (OUTPATIENT)
Dept: FAMILY MEDICINE | Facility: CLINIC | Age: 38
End: 2018-05-21

## 2018-05-21 DIAGNOSIS — F98.8 ATTENTION DEFICIT DISORDER, UNSPECIFIED HYPERACTIVITY PRESENCE: ICD-10-CM

## 2018-05-21 NOTE — TELEPHONE ENCOUNTER
Message from [employer]:  Original authorizing provider: Randall Jason MD    Nicki Frey would like a refill of the following medications:  amphetamine-dextroamphetamine (ADDERALL) 10 MG per tablet [Randall Jason MD]  amphetamine-dextroamphetamine (ADDERALL XR) 30 MG per 24 hr capsule [Randall Jason MD]    Preferred pharmacy: [facility] [address] [place] [address] [address] AT [facility]    Comment:  Can this be mailed to my pharmacy on file in [place]. As well as a email or voicemail to confirm this has been actioned . [phone]

## 2018-05-21 NOTE — TELEPHONE ENCOUNTER
Requested Prescriptions   Pending Prescriptions Disp Refills     amphetamine-dextroamphetamine (ADDERALL) 10 MG per tablet 30 tablet 0     Sig: May take 1 tablet in the afternoon as needed    There is no refill protocol information for this order        amphetamine-dextroamphetamine (ADDERALL XR) 30 MG per 24 hr capsule 30 capsule 0     Sig: Take 1 capsule (30 mg) by mouth daily Follow up with Dr. Jason in 1 month    There is no refill protocol information for this order            Last Written Prescription Date:  4/5/2018  Last Fill Quantity: 30,   # refills: 0  Last Office Visit: 2/13/2018  Future Office visit:       Routing refill request to provider for review/approval because:  Drug not on the [facility], [facility] or [facility] refill protocol or controlled substance

## 2018-05-22 RX ORDER — DEXTROAMPHETAMINE SACCHARATE, AMPHETAMINE ASPARTATE MONOHYDRATE, DEXTROAMPHETAMINE SULFATE AND AMPHETAMINE SULFATE 7.5; 7.5; 7.5; 7.5 MG/1; MG/1; MG/1; MG/1
30 CAPSULE, EXTENDED RELEASE ORAL DAILY
Qty: 30 CAPSULE | Refills: 0 | Status: SHIPPED | OUTPATIENT
Start: 2018-05-22 | End: 2018-06-01

## 2018-05-22 RX ORDER — DEXTROAMPHETAMINE SACCHARATE, AMPHETAMINE ASPARTATE, DEXTROAMPHETAMINE SULFATE AND AMPHETAMINE SULFATE 2.5; 2.5; 2.5; 2.5 MG/1; MG/1; MG/1; MG/1
TABLET ORAL
Qty: 30 TABLET | Refills: 0 | Status: SHIPPED | OUTPATIENT
Start: 2018-05-22 | End: 2018-06-01

## 2018-05-24 NOTE — TELEPHONE ENCOUNTER
Patient called and asked if we can Mail this to her Pharmacy  [id number] - [facility] [address] AT [facility]    Thank you

## 2018-06-01 RX ORDER — DEXTROAMPHETAMINE SACCHARATE, AMPHETAMINE ASPARTATE MONOHYDRATE, DEXTROAMPHETAMINE SULFATE AND AMPHETAMINE SULFATE 7.5; 7.5; 7.5; 7.5 MG/1; MG/1; MG/1; MG/1
30 CAPSULE, EXTENDED RELEASE ORAL DAILY
Qty: 30 CAPSULE | Refills: 0 | Status: SHIPPED | OUTPATIENT
Start: 2018-06-01 | End: 2018-06-19

## 2018-06-01 RX ORDER — DEXTROAMPHETAMINE SACCHARATE, AMPHETAMINE ASPARTATE, DEXTROAMPHETAMINE SULFATE AND AMPHETAMINE SULFATE 2.5; 2.5; 2.5; 2.5 MG/1; MG/1; MG/1; MG/1
TABLET ORAL
Qty: 30 TABLET | Refills: 0 | Status: SHIPPED | OUTPATIENT
Start: 2018-06-01 | End: 2018-06-19

## 2018-06-01 NOTE — TELEPHONE ENCOUNTER
Printed Rx is at the Rx-to-fax bin.    Please also schedule the patient for follow-up as she is due for one.

## 2018-06-01 NOTE — TELEPHONE ENCOUNTER
Pt reports that the medication has not yet been received.  She would like to know if she can  a rx today

## 2018-06-01 NOTE — TELEPHONE ENCOUNTER
Patient states she needs Rx today   [facility] never received Rxs from 5/22/18  I spoke with [facility] - states last Rx they have was written 4/5 picked up on 4/9   They are unsure what happened but they never got her Rxs    Patient requesting to  scripts at the clinic today   Wants call when they are ready     Detailed message is okay     Thank you,   Jennifer JOSHI RN

## 2018-06-01 NOTE — TELEPHONE ENCOUNTER
Patient notified - Rxs placed at  for . She scheduled an OV with PCP   Next 5 appointments (look out 90 days)     Jun 14, 2018  6:00 PM CDT   [facility] Visit with Randall Jason MD   [facility]    [address] [facility] [address]   [phone]                Jennifer JOSHI RN

## 2018-06-19 ENCOUNTER — OFFICE VISIT (OUTPATIENT)
Dept: FAMILY MEDICINE | Facility: CLINIC | Age: 38
End: 2018-06-19
Payer: COMMERCIAL

## 2018-06-19 VITALS
WEIGHT: 147 LBS | BODY MASS INDEX: 24.49 KG/M2 | OXYGEN SATURATION: 99 % | HEART RATE: 92 BPM | HEIGHT: 65 IN | SYSTOLIC BLOOD PRESSURE: 113 MMHG | DIASTOLIC BLOOD PRESSURE: 73 MMHG

## 2018-06-19 DIAGNOSIS — F41.8 DEPRESSION WITH ANXIETY: Primary | ICD-10-CM

## 2018-06-19 DIAGNOSIS — F98.8 ATTENTION DEFICIT DISORDER, UNSPECIFIED HYPERACTIVITY PRESENCE: ICD-10-CM

## 2018-06-19 PROCEDURE — 99214 OFFICE O/P EST MOD 30 MIN: CPT | Performed by: INTERNAL MEDICINE

## 2018-06-19 RX ORDER — DEXTROAMPHETAMINE SACCHARATE, AMPHETAMINE ASPARTATE, DEXTROAMPHETAMINE SULFATE AND AMPHETAMINE SULFATE 2.5; 2.5; 2.5; 2.5 MG/1; MG/1; MG/1; MG/1
TABLET ORAL
Qty: 30 TABLET | Refills: 0 | Status: SHIPPED | OUTPATIENT
Start: 2018-06-19 | End: 2018-11-08

## 2018-06-19 RX ORDER — ESCITALOPRAM OXALATE 10 MG/1
10 TABLET ORAL DAILY
Qty: 30 TABLET | Refills: 1 | Status: SHIPPED | OUTPATIENT
Start: 2018-06-19 | End: 2018-10-02

## 2018-06-19 RX ORDER — DEXTROAMPHETAMINE SACCHARATE, AMPHETAMINE ASPARTATE MONOHYDRATE, DEXTROAMPHETAMINE SULFATE AND AMPHETAMINE SULFATE 7.5; 7.5; 7.5; 7.5 MG/1; MG/1; MG/1; MG/1
30 CAPSULE, EXTENDED RELEASE ORAL DAILY
Qty: 30 CAPSULE | Refills: 0 | Status: SHIPPED | OUTPATIENT
Start: 2018-06-19 | End: 2018-11-08

## 2018-06-19 ASSESSMENT — ENCOUNTER SYMPTOMS
TREMORS: 0
BLURRED VISION: 0
CONSTIPATION: 1
BLOOD IN STOOL: 0
INSOMNIA: 0
HALLUCINATIONS: 0
DIARRHEA: 0
DIZZINESS: 0
WEIGHT LOSS: 0
ABDOMINAL PAIN: 0
PALPITATIONS: 0
NAUSEA: 0
DEPRESSION: 1
HEADACHES: 0
MYALGIAS: 0
NERVOUS/ANXIOUS: 1
VOMITING: 0

## 2018-06-19 NOTE — NURSING NOTE
"Chief Complaint   Patient presents with     Recheck Medication     Adderall        Initial /73 (BP Location: Right arm, Patient Position: Chair, Cuff Size: Adult Regular)  Pulse 92  Ht 5' 4.5\" (1.638 m)  Wt 147 lb (66.7 kg)  LMP 05/29/2018  SpO2 99%  BMI 24.84 kg/m2 Estimated body mass index is 24.84 kg/(m^2) as calculated from the following:    Height as of this encounter: 5' 4.5\" (1.638 m).    Weight as of this encounter: 147 lb (66.7 kg)..    BP completed using cuff size: regular  MEDICATIONS REVIEWED  SOCIAL AND FAMILY HX REVIEWED  Faby Rushing CMA  "

## 2018-06-19 NOTE — PROGRESS NOTES
"HPI    SUBJECTIVE:   Nicki Frey is a 37 year old female who presents to clinic today for the following health issues:      I last saw the patient at the clinic on 2/13/2018 to discuss her attention deficit disorder and major depression. I started her on Adderall XR 20 mg daily in the morning and regular release Adderall 10 mg in the afternoon.  Adderall XR dose was increased to 30 mg daily with better results. Patient was also scheduled to meet with a counselor.    Since her last clinic visit, the patient states that her depression has been worsening due to personal/work stressors.  This has also affected her ability to sustain her attention at work. Denies suicidal/homicidal ideation. She has been meeting consistently with her counselor.      Past Medical History:   Diagnosis Date     Attention deficit disorder        Review of Systems   Constitutional: Negative for malaise/fatigue and weight loss.   Eyes: Negative for blurred vision.   Cardiovascular: Negative for chest pain and palpitations.   Gastrointestinal: Positive for constipation (Chronic, no relief from over-the-counter remedies). Negative for abdominal pain, blood in stool, diarrhea, melena, nausea and vomiting.   Musculoskeletal: Negative for myalgias.   Neurological: Negative for dizziness, tremors and headaches.   Psychiatric/Behavioral: Positive for depression. Negative for hallucinations and suicidal ideas. The patient is nervous/anxious. The patient does not have insomnia.        /73 (BP Location: Right arm, Patient Position: Chair, Cuff Size: Adult Regular)  Pulse 92  Ht 5' 4.5\" (1.638 m)  Wt 147 lb (66.7 kg)  LMP 05/29/2018  SpO2 99%  BMI 24.84 kg/m2      Physical Exam   Constitutional: She is oriented to person, place, and time. No distress.   Neck: No thyromegaly present.   Cardiovascular: Normal rate, regular rhythm and normal heart sounds.    Neurological: She is alert and oriented to person, place, and time. GCS score is 15. "   Psychiatric: Affect normal.   (+) depressed mood   Vitals reviewed.        ICD-10-CM    1. Depression with anxiety F41.8 escitalopram (LEXAPRO) 10 MG tablet   2. Attention deficit disorder, unspecified hyperactivity presence F98.8 amphetamine-dextroamphetamine (ADDERALL XR) 30 MG per 24 hr capsule     amphetamine-dextroamphetamine (ADDERALL) 10 MG per tablet       Patient Instructions   Call doctor if your depression or anxiety persist/worsens, or if you develop new symptoms or side effects from the medication/s.    Follow up in 1 month.

## 2018-06-19 NOTE — PATIENT INSTRUCTIONS
Call doctor if your depression or anxiety persist/worsens, or if you develop new symptoms or side effects from the medication/s.    Follow up in 1 month.

## 2018-06-19 NOTE — MR AVS SNAPSHOT
After Visit Summary   6/19/2018    Nicki Frey    MRN: [id number]           Patient Information     Date Of Birth          1980        Visit Information        Provider Department      6/19/2018 11:30 AM Randall Jason MD [facility]        Today's Diagnoses     Depression with anxiety    -  1    Attention deficit disorder, unspecified hyperactivity presence          Care Instructions    Call doctor if your depression or anxiety persist/worsens, or if you develop new symptoms or side effects from the medication/s.    Follow up in 1 month.                Follow-ups after your visit        Who to contact     If you have questions or need follow up information about today's clinic visit or your schedule please contact [facility] directly at [phone].  Normal or non-critical lab and imaging results will be communicated to you by MyChart, letter or phone within 4 business days after the clinic has received the results. If you do not hear from us within 7 days, please contact the clinic through [employer] or phone. If you have a critical or abnormal lab result, we will notify you by phone as soon as possible.  Submit refill requests through [employer] or call your pharmacy and they will forward the refill request to us. Please allow 3 business days for your refill to be completed.          Additional Information About Your Visit        MyChart Information     [employer] gives you secure access to your electronic health record. If you see a primary care provider, you can also send messages to your care team and make appointments. If you have questions, please call your primary care clinic.  If you do not have a primary care provider, please call [phone] and they will assist you.        Care EveryWhere ID     This is your Care EveryWhere ID. This could be used by other organizations to access your [place] medical records  [id number]        Your Vitals Were   "   Pulse Height Last Period Pulse Oximetry BMI (Body Mass Index)       92 5' 4.5\" (1.638 m) 05/29/2018 99% 24.84 kg/m2        Blood Pressure from Last 3 Encounters:   06/19/18 113/73   02/13/18 106/73   06/06/17 122/80    Weight from Last 3 Encounters:   06/19/18 147 lb (66.7 kg)   02/13/18 158 lb 6.4 oz (71.8 kg)   06/06/17 159 lb (72.1 kg)              Today, you had the following     No orders found for display         Today's Medication Changes          These changes are accurate as of 6/19/18 11:59 AM.  If you have any questions, ask your nurse or doctor.               Start taking these medicines.        Dose/Directions    escitalopram 10 MG tablet   Commonly known as:  LEXAPRO   Used for:  Depression with anxiety   Started by:  Randall Jason MD        Dose:  10 mg   Take 1 tablet (10 mg) by mouth daily   Quantity:  30 tablet   Refills:  1            Where to get your medicines      These medications were sent to [facility] [address] [place] [address]  AT [address] [address]     Phone:  [phone]     escitalopram 10 MG tablet         Some of these will need a paper prescription and others can be bought over the counter.  Ask your nurse if you have questions.     Bring a paper prescription for each of these medications     amphetamine-dextroamphetamine 10 MG per tablet    amphetamine-dextroamphetamine 30 MG per 24 hr capsule                Primary Care Provider Office Phone # Fax #    Randall Jason [phone] [address] [facility] [address]  [facility] [address]        Equal Access to Services     IZAIAH WHITTINGTON AH: Hadii nate [place], waaxda luqadaha, qaybta kaalmada adeegyada, bridget blank. So [facility] [phone].    ATENCIÓN: Si habla español, tiene a bergman disposición servicios gratuitos de asistencia lingüística. Llame al [phone].    We comply " with applicable federal civil rights laws and [place] laws. We do not discriminate on the basis of race, color, national origin, age, disability, sex, sexual orientation, or gender identity.            Thank you!     Thank you for choosing [facility]  for your care. Our goal is always to provide you with excellent care. Hearing back from our patients is one way we can continue to improve our services. Please take a few minutes to complete the written survey that you may receive in the mail after your visit with us. Thank you!             Your Updated Medication List - Protect others around you: Learn how to safely use, store and throw away your medicines at www.disposemymeds.org.          This list is accurate as of 6/19/18 11:59 AM.  Always use your most recent med list.                   Brand Name Dispense Instructions for use Diagnosis    acetaminophen 500 MG tablet    TYLENOL     Take 1,000 mg by mouth        * amphetamine-dextroamphetamine 30 MG per 24 hr capsule    ADDERALL XR    30 capsule    Take 1 capsule (30 mg) by mouth daily Follow up with Dr. Jason in 1 month    Attention deficit disorder, unspecified hyperactivity presence       * amphetamine-dextroamphetamine 10 MG per tablet    ADDERALL    30 tablet    May take 1 tablet in the afternoon as needed    Attention deficit disorder, unspecified hyperactivity presence       escitalopram 10 MG tablet    LEXAPRO    30 tablet    Take 1 tablet (10 mg) by mouth daily    Depression with anxiety       naproxen sodium 220 MG capsule           * Notice:  This list has 2 medication(s) that are the same as other medications prescribed for you. Read the directions carefully, and ask your doctor or other care provider to review them with you.

## 2018-06-20 ASSESSMENT — PATIENT HEALTH QUESTIONNAIRE - PHQ9: SUM OF ALL RESPONSES TO PHQ QUESTIONS 1-9: 10

## 2018-07-27 ENCOUNTER — NURSE TRIAGE (OUTPATIENT)
Dept: NURSING | Facility: CLINIC | Age: 38
End: 2018-07-27

## 2018-07-28 NOTE — TELEPHONE ENCOUNTER
"[facility]'s called and noted that patient came in with script for Adderall refill but is 2 days early and need approval from MD to fill. Paged on call Dr. Woods via page  Valorie on his cell and Dr. Woods gave verbal approval for early fill notified pharmacist at [facility]. No triage required.    Kari Montez RN, BSN  [facility]    Reason for Disposition    [1] Request for URGENT new prescription or refill of \"essential\" medication (i.e., likelihood of harm to patient if not taken) AND [2] triager unable to fill per unit policy    Additional Information    Negative: Drug overdose and nurse unable to answer question    Negative: Caller requesting information not related to medicine    Negative: Caller requesting a prescription for Strep throat and has a positive culture result    Negative: Rash while taking a medication or within 3 days of stopping it    Negative: Immunization reaction suspected    Negative: [1] Asthma and [2] having symptoms of asthma (cough, wheezing, etc)    Negative: MORE THAN A DOUBLE DOSE of a prescription or over-the-counter (OTC) drug    Negative: [1] DOUBLE DOSE (an extra dose or lesser amount) of over-the-counter (OTC) drug AND [2] any symptoms (e.g., dizziness, nausea, pain, sleepiness)    Negative: [1] DOUBLE DOSE (an extra dose or lesser amount) of prescription drug AND [2] any symptoms (e.g., dizziness, nausea, pain, sleepiness)    Negative: Took another person's prescription drug    Negative: [1] DOUBLE DOSE (an extra dose or lesser amount) of prescription drug AND [2] NO symptoms (Exception: a double dose of antibiotics)    Negative: Diabetes drug error or overdose (e.g., insulin or extra dose)    Protocols used: MEDICATION QUESTION CALL-ADULT-    Kari Montez RN, BSN  [facility]    "

## 2018-10-02 ENCOUNTER — MYC REFILL (OUTPATIENT)
Dept: FAMILY MEDICINE | Facility: CLINIC | Age: 38
End: 2018-10-02

## 2018-10-02 DIAGNOSIS — F41.8 DEPRESSION WITH ANXIETY: ICD-10-CM

## 2018-10-02 DIAGNOSIS — F98.8 ATTENTION DEFICIT DISORDER, UNSPECIFIED HYPERACTIVITY PRESENCE: ICD-10-CM

## 2018-10-02 RX ORDER — DEXTROAMPHETAMINE SACCHARATE, AMPHETAMINE ASPARTATE, DEXTROAMPHETAMINE SULFATE AND AMPHETAMINE SULFATE 2.5; 2.5; 2.5; 2.5 MG/1; MG/1; MG/1; MG/1
TABLET ORAL
Qty: 30 TABLET | Refills: 0 | Status: CANCELLED | OUTPATIENT
Start: 2018-10-02

## 2018-10-02 RX ORDER — DEXTROAMPHETAMINE SACCHARATE, AMPHETAMINE ASPARTATE MONOHYDRATE, DEXTROAMPHETAMINE SULFATE AND AMPHETAMINE SULFATE 7.5; 7.5; 7.5; 7.5 MG/1; MG/1; MG/1; MG/1
30 CAPSULE, EXTENDED RELEASE ORAL DAILY
Qty: 30 CAPSULE | Refills: 0 | Status: CANCELLED | OUTPATIENT
Start: 2018-10-02

## 2018-10-03 NOTE — TELEPHONE ENCOUNTER
Message from [employer]:  Original authorizing provider: MD Nicki Coleman would like a refill of the following medications:  escitalopram (LEXAPRO) 10 MG tablet [Randall Jason MD]  amphetamine-dextroamphetamine (ADDERALL XR) 30 MG per 24 hr capsule [Randall Jason MD]  amphetamine-dextroamphetamine (ADDERALL) 10 MG per tablet [Randall Jason MD]    Preferred pharmacy: [facility] [address] [place] [address] [address] AT [facility]    Comment:  Please refill and send prescriptions via mail to pharmacy. If you can call and leave a voicemail to [phone] and confirm this has been received. Thank you Nicki Frey

## 2018-10-03 NOTE — TELEPHONE ENCOUNTER
"Amphetamine-dextroamphetamine 30 mg    Last Written Prescription Date:  06/19/18  Last Fill Quantity: 30 capsules,  # refills: 0   Last office visit: 6/19/2018 with prescribing provider:  Eren   Future Office Visit:      Amphetamine-dextroamphetamine 10 mg    Last Written Prescription Date:  06/19/18  Last Fill Quantity: 30 tablets,  # refills: 0   Last office visit: 6/19/2018 with prescribing provider:  Eren   Future Office Visit:      Escitalopram 10 mg    Last Written Prescription Date:  06/19/18  Last Fill Quantity: 30 tablets,  # refills: 1   Last office visit: 6/19/2018 with prescribing provider:  Eren   Future Office Visit:      Requested Prescriptions   Pending Prescriptions Disp Refills     escitalopram (LEXAPRO) 10 MG tablet 30 tablet 1     Sig: Take 1 tablet (10 mg) by mouth daily    SSRIs Protocol Passed    10/3/2018  8:43 AM       Passed - Recent (12 mo) or future (30 days) visit within the authorizing provider's specialty    Patient had office visit in the last 12 months or has a visit in the next 30 days with authorizing provider or within the authorizing provider's specialty.  See \"Patient Info\" tab in inbasket, or \"Choose Columns\" in Meds & Orders section of the refill encounter.           Passed - Patient is age 18 or older       Passed - No active pregnancy on record       Passed - No positive pregnancy test in last 12 months        amphetamine-dextroamphetamine (ADDERALL XR) 30 MG per 24 hr capsule 30 capsule 0     Sig: Take 1 capsule (30 mg) by mouth daily Follow up with Dr. Jason in 1 month    There is no refill protocol information for this order        amphetamine-dextroamphetamine (ADDERALL) 10 MG per tablet 30 tablet 0     Sig: May take 1 tablet in the afternoon as needed    There is no refill protocol information for this order        Routing refill request to provider for review/approval because:  Drug not on the [facility], [facility] or [facility] refill protocol or controlled substance    "

## 2018-10-04 NOTE — TELEPHONE ENCOUNTER
Sent My Chart message to patient.  #30 in June of the meds below.  Has she been out of meds since July?    Tiara Brody RN

## 2018-10-08 NOTE — TELEPHONE ENCOUNTER
Patient states she got another refill since June and asks us to check with Pharmacy.  Call to [facility] in [place]:    Patient has not picked up Lexapro since July 12, Adderall 30mg and Adderall 10 mg last picked up July 27.  She should have been out of all these medications by the end of August.    See patient message.  Janie Rivera RN

## 2018-10-10 RX ORDER — ESCITALOPRAM OXALATE 10 MG/1
10 TABLET ORAL DAILY
Qty: 30 TABLET | Refills: 0 | Status: SHIPPED | OUTPATIENT
Start: 2018-10-10 | End: 2018-11-08

## 2018-10-10 NOTE — TELEPHONE ENCOUNTER
"Lengthy MyChart messages back and forth re: Lexapro and Adderral.  Per her messages below, she currently has a few Adderall remaining and was advised to make a follow up appt.to discuss this AND Lexapro.    Took Lexapro X 2 months (through August), then ran out and didn't follow up.      Due to lapse in therapy, will route to PCP to advise. Approve Lexapro if okay for patient to \"restart\", as she feels it was helpful.  Will encourage follow up appt.within the next few weeks after restarting med.    Thank you,  Tiara Brody RN    "

## 2018-10-11 NOTE — TELEPHONE ENCOUNTER
One-month refill granted. Please advise patient to follow up in 3 weeks to assess her response to the medication. Please also inform patient that there will be no further refills of her Lexapro without a follow-up visit. We will refill Adderall at her clinic visit.

## 2018-11-08 ENCOUNTER — OFFICE VISIT (OUTPATIENT)
Dept: FAMILY MEDICINE | Facility: CLINIC | Age: 38
End: 2018-11-08

## 2018-11-08 VITALS
SYSTOLIC BLOOD PRESSURE: 108 MMHG | TEMPERATURE: 98.6 F | OXYGEN SATURATION: 99 % | HEIGHT: 65 IN | HEART RATE: 70 BPM | WEIGHT: 154 LBS | BODY MASS INDEX: 25.66 KG/M2 | DIASTOLIC BLOOD PRESSURE: 72 MMHG

## 2018-11-08 DIAGNOSIS — F98.8 ATTENTION DEFICIT DISORDER, UNSPECIFIED HYPERACTIVITY PRESENCE: ICD-10-CM

## 2018-11-08 DIAGNOSIS — G44.209 TENSION HEADACHE: ICD-10-CM

## 2018-11-08 DIAGNOSIS — F33.0 MILD EPISODE OF RECURRENT MAJOR DEPRESSIVE DISORDER (H): Primary | ICD-10-CM

## 2018-11-08 PROCEDURE — 99214 OFFICE O/P EST MOD 30 MIN: CPT | Performed by: INTERNAL MEDICINE

## 2018-11-08 RX ORDER — DEXTROAMPHETAMINE SACCHARATE, AMPHETAMINE ASPARTATE MONOHYDRATE, DEXTROAMPHETAMINE SULFATE AND AMPHETAMINE SULFATE 7.5; 7.5; 7.5; 7.5 MG/1; MG/1; MG/1; MG/1
30 CAPSULE, EXTENDED RELEASE ORAL DAILY
Qty: 30 CAPSULE | Refills: 0 | Status: SHIPPED | OUTPATIENT
Start: 2018-12-08 | End: 2019-04-15

## 2018-11-08 RX ORDER — ESCITALOPRAM OXALATE 10 MG/1
TABLET ORAL
Qty: 60 TABLET | Refills: 0 | Status: SHIPPED | OUTPATIENT
Start: 2018-11-08 | End: 2019-04-24

## 2018-11-08 RX ORDER — ACETAMINOPHEN 500 MG
1000 TABLET ORAL EVERY 6 HOURS PRN
Qty: 100 TABLET | Refills: 3 | Status: SHIPPED | OUTPATIENT
Start: 2018-11-08 | End: 2022-03-04

## 2018-11-08 RX ORDER — DEXTROAMPHETAMINE SACCHARATE, AMPHETAMINE ASPARTATE, DEXTROAMPHETAMINE SULFATE AND AMPHETAMINE SULFATE 2.5; 2.5; 2.5; 2.5 MG/1; MG/1; MG/1; MG/1
TABLET ORAL
Qty: 30 TABLET | Refills: 0 | Status: SHIPPED | OUTPATIENT
Start: 2018-12-08 | End: 2019-04-15

## 2018-11-08 RX ORDER — COVID-19 ANTIGEN TEST
KIT MISCELLANEOUS
Qty: 60 CAPSULE | Status: CANCELLED | OUTPATIENT
Start: 2018-11-08

## 2018-11-08 RX ORDER — DEXTROAMPHETAMINE SACCHARATE, AMPHETAMINE ASPARTATE MONOHYDRATE, DEXTROAMPHETAMINE SULFATE AND AMPHETAMINE SULFATE 7.5; 7.5; 7.5; 7.5 MG/1; MG/1; MG/1; MG/1
30 CAPSULE, EXTENDED RELEASE ORAL DAILY
Qty: 30 CAPSULE | Refills: 0 | Status: SHIPPED | OUTPATIENT
Start: 2018-11-08 | End: 2018-11-08

## 2018-11-08 RX ORDER — DEXTROAMPHETAMINE SACCHARATE, AMPHETAMINE ASPARTATE, DEXTROAMPHETAMINE SULFATE AND AMPHETAMINE SULFATE 2.5; 2.5; 2.5; 2.5 MG/1; MG/1; MG/1; MG/1
TABLET ORAL
Qty: 30 TABLET | Refills: 0 | Status: SHIPPED | OUTPATIENT
Start: 2018-11-08 | End: 2018-11-08

## 2018-11-08 RX ORDER — ESCITALOPRAM OXALATE 10 MG/1
TABLET ORAL
Qty: 30 TABLET | Refills: 0 | Status: SHIPPED | OUTPATIENT
Start: 2018-11-08 | End: 2018-11-08

## 2018-11-08 ASSESSMENT — ENCOUNTER SYMPTOMS
BLURRED VISION: 0
INSOMNIA: 0
NERVOUS/ANXIOUS: 1
DIARRHEA: 0
VOMITING: 0
PALPITATIONS: 0
MYALGIAS: 0
DEPRESSION: 1
TREMORS: 0
NAUSEA: 0
WEIGHT LOSS: 0
DIZZINESS: 0
CONSTIPATION: 0
HALLUCINATIONS: 0
ABDOMINAL PAIN: 0
HEADACHES: 1

## 2018-11-08 ASSESSMENT — PATIENT HEALTH QUESTIONNAIRE - PHQ9: SUM OF ALL RESPONSES TO PHQ QUESTIONS 1-9: 8

## 2018-11-08 NOTE — MR AVS SNAPSHOT
After Visit Summary   11/8/2018    Nicki Frey    MRN: [id number]           Patient Information     Date Of Birth          1980        Visit Information        Provider Department      11/8/2018 6:00 PM Randall Jason MD [facility]        Today's Diagnoses     Mild episode of recurrent major depressive disorder (H)    -  1    Attention deficit disorder, unspecified hyperactivity presence        Tension headache          Care Instructions    Restart Lexapro and take as directed: Half tab daily for 1st week, then 1 tab daily x 1 week, then 1 1/2 tabs daily for 1 week, then 2 tabs daily    Call doctor if you develop any side effects from the medication.    Call doctor if your depression persist/worsens, or if you develop new symptoms.    Follow up in 2 months.                Follow-ups after your visit        Who to contact     If you have questions or need follow up information about today's clinic visit or your schedule please contact [facility] directly at [phone].  Normal or non-critical lab and imaging results will be communicated to you by [employer], letter or phone within 4 business days after the clinic has received the results. If you do not hear from us within 7 days, please contact the clinic through [employer] or phone. If you have a critical or abnormal lab result, we will notify you by phone as soon as possible.  Submit refill requests through [employer] or call your pharmacy and they will forward the refill request to us. Please allow 3 business days for your refill to be completed.          Additional Information About Your Visit        MyChart Information     [employer] gives you secure access to your electronic health record. If you see a primary care provider, you can also send messages to your care team and make appointments. If you have questions, please call your primary care clinic.  If you do not have a primary care provider, please call  "[phone] and they will assist you.        Care EveryWhere ID     This is your Care EveryWhere ID. This could be used by other organizations to access your [place] medical records  [id number]        Your Vitals Were     Pulse Temperature Height Last Period Pulse Oximetry Breastfeeding?    70 98.6  F (37  C) (Oral) 5' 4.5\" (1.638 m) 10/08/2018 99% No    BMI (Body Mass Index)                   26.03 kg/m2            Blood Pressure from Last 3 Encounters:   11/08/18 108/72   06/19/18 113/73   02/13/18 106/73    Weight from Last 3 Encounters:   11/08/18 154 lb (69.9 kg)   06/19/18 147 lb (66.7 kg)   02/13/18 158 lb 6.4 oz (71.8 kg)              Today, you had the following     No orders found for display         Today's Medication Changes          These changes are accurate as of 11/8/18  6:53 PM.  If you have any questions, ask your nurse or doctor.               Start taking these medicines.        Dose/Directions    * amphetamine-dextroamphetamine 10 MG per tablet   Commonly known as:  ADDERALL   Used for:  Attention deficit disorder, unspecified hyperactivity presence   Started by:  Randall Jason MD        Start taking on:  12/8/2018   May take 1 tablet in the afternoon as needed   Quantity:  30 tablet   Refills:  0       * amphetamine-dextroamphetamine 30 MG per 24 hr capsule   Commonly known as:  ADDERALL XR   Used for:  Attention deficit disorder, unspecified hyperactivity presence   Started by:  Randall Jason MD        Dose:  30 mg   Start taking on:  12/8/2018   Take 1 capsule (30 mg) by mouth daily   Quantity:  30 capsule   Refills:  0       escitalopram 10 MG tablet   Commonly known as:  LEXAPRO   Used for:  Mild episode of recurrent major depressive disorder (H)   Started by:  Randall Jason MD        Half tab daily for 1st week, then 1 tab daily x 1 week, then 1 1/2 tabs daily for 1 week, then 2 tabs daily   Quantity:  60 tablet   Refills:  0 "       * Notice:  This list has 2 medication(s) that are the same as other medications prescribed for you. Read the directions carefully, and ask your doctor or other care provider to review them with you.      These medicines have changed or have updated prescriptions.        Dose/Directions    acetaminophen 500 MG tablet   Commonly known as:  TYLENOL   This may have changed:    - when to take this  - reasons to take this   Used for:  Tension headache   Changed by:  Randall Jason MD        Dose:  1000 mg   Take 2 tablets (1,000 mg) by mouth every 6 hours as needed for mild pain   Quantity:  100 tablet   Refills:  3            Where to get your medicines      These medications were sent to [id number] - [address]  AT [address] [facility] [address]     Phone:  [phone]     acetaminophen 500 MG tablet    escitalopram 10 MG tablet         Some of these will need a paper prescription and others can be bought over the counter.  Ask your nurse if you have questions.     Bring a paper prescription for each of these medications     amphetamine-dextroamphetamine 10 MG per tablet    amphetamine-dextroamphetamine 30 MG per 24 hr capsule                Primary Care Provider Office Phone # Fax #    Randall Jason [phone] [address] [address]        Equal Access to Services     NATHALIE WHITTINGTON AH: Hadii nate [place] hadasho Socarol, waaxda luqadaha, qaybta kaalmada adeegyada, bridget hunter haybrandon reddy . So [facility] [phone].    ATENCIÓN: Si habla español, tiene a bergman disposición servicios gratuitos de asistencia lingüística. Llame al [phone].    We comply with applicable federal civil rights laws and [place] laws. We do not discriminate on the basis of race, color, national origin, age, disability, sex, sexual orientation, or gender identity.             Thank you!     Thank you for choosing [facility]  for your care. Our goal is always to provide you with excellent care. Hearing back from our patients is one way we can continue to improve our services. Please take a few minutes to complete the written survey that you may receive in the mail after your visit with us. Thank you!             Your Updated Medication List - Protect others around you: Learn how to safely use, store and throw away your medicines at www.disposemymeds.org.          This list is accurate as of 11/8/18  6:53 PM.  Always use your most recent med list.                   Brand Name Dispense Instructions for use Diagnosis    acetaminophen 500 MG tablet    TYLENOL    100 tablet    Take 2 tablets (1,000 mg) by mouth every 6 hours as needed for mild pain    Tension headache       * amphetamine-dextroamphetamine 10 MG per tablet   Start taking on:  12/8/2018    ADDERALL    30 tablet    May take 1 tablet in the afternoon as needed    Attention deficit disorder, unspecified hyperactivity presence       * amphetamine-dextroamphetamine 30 MG per 24 hr capsule   Start taking on:  12/8/2018    ADDERALL XR    30 capsule    Take 1 capsule (30 mg) by mouth daily    Attention deficit disorder, unspecified hyperactivity presence       escitalopram 10 MG tablet    LEXAPRO    60 tablet    Half tab daily for 1st week, then 1 tab daily x 1 week, then 1 1/2 tabs daily for 1 week, then 2 tabs daily    Mild episode of recurrent major depressive disorder (H)       naproxen sodium 220 MG capsule           * Notice:  This list has 2 medication(s) that are the same as other medications prescribed for you. Read the directions carefully, and ask your doctor or other care provider to review them with you.

## 2018-11-08 NOTE — PROGRESS NOTES
"HPI    I last saw the patient at the clinic on 6/19/2018 for her depression and attention deficit disorder.    I started her on Lexapro 10 mg daily and continued her Adderall regimen of XR 30 mg in the morning and immediate release 10 mg in the afternoon. Patient was then lost to follow-up.    Since her last clinic visit, she has been struggling more with her depression. She is going through a lot of personal stressors particularly at work. Was previously experiencing tension headaches but this has improved when she started a new assignment which is less stressful. Being that she is off her Adderall, she is also experiencing difficulty with concentrating on her work. She states that she tolerated the Lexapro well.      Past Medical History:   Diagnosis Date     Attention deficit disorder        Review of Systems   Constitutional: Negative for malaise/fatigue and weight loss.   Eyes: Negative for blurred vision.   Cardiovascular: Negative for chest pain and palpitations.   Gastrointestinal: Negative for abdominal pain, constipation, diarrhea, nausea and vomiting.   Musculoskeletal: Negative for myalgias.   Neurological: Positive for headaches (see HPI). Negative for dizziness and tremors.   Psychiatric/Behavioral: Positive for depression. Negative for hallucinations and suicidal ideas. The patient is nervous/anxious. The patient does not have insomnia.        /72 (BP Location: Right arm, Patient Position: Sitting, Cuff Size: Adult Large)  Pulse 70  Temp 98.6  F (37  C) (Oral)  Ht 5' 4.5\" (1.638 m)  Wt 154 lb (69.9 kg)  LMP 10/08/2018  SpO2 99%  Breastfeeding? No  BMI 26.03 kg/m2      Physical Exam   Constitutional: She is oriented to person, place, and time. No distress.   Neck: No thyromegaly present.   Cardiovascular: Normal rate, regular rhythm and normal heart sounds.    Neurological: She is alert and oriented to person, place, and time. GCS score is 15.   Psychiatric: Mood and affect normal. "   Vitals reviewed.        ICD-10-CM    1. Mild episode of recurrent major depressive disorder (H) F33.0 escitalopram (LEXAPRO) 10 MG tablet   2. Attention deficit disorder, unspecified hyperactivity presence F98.8 amphetamine-dextroamphetamine (ADDERALL) 10 MG per tablet     amphetamine-dextroamphetamine (ADDERALL XR) 30 MG per 24 hr capsule     DISCONTINUED: amphetamine-dextroamphetamine (ADDERALL XR) 30 MG per 24 hr capsule     DISCONTINUED: amphetamine-dextroamphetamine (ADDERALL) 10 MG per tablet   3. Tension headache G44.209 acetaminophen (TYLENOL) 500 MG tablet       Patient Instructions   Restart Lexapro and take as directed: Half tab daily for 1st week, then 1 tab daily x 1 week, then 1 1/2 tabs daily for 1 week, then 2 tabs daily    Call doctor if you develop any side effects from the medication.    Call doctor if your depression persist/worsens, or if you develop new symptoms.    Follow up in 2 months.

## 2018-11-09 NOTE — PATIENT INSTRUCTIONS
Restart Lexapro and take as directed: Half tab daily for 1st week, then 1 tab daily x 1 week, then 1 1/2 tabs daily for 1 week, then 2 tabs daily    Call doctor if you develop any side effects from the medication.    Call doctor if your depression persist/worsens, or if you develop new symptoms.    Follow up in 2 months.

## 2019-03-09 ENCOUNTER — HEALTH MAINTENANCE LETTER (OUTPATIENT)
Age: 39
End: 2019-03-09

## 2019-04-15 ENCOUNTER — MYC REFILL (OUTPATIENT)
Dept: FAMILY MEDICINE | Facility: CLINIC | Age: 39
End: 2019-04-15

## 2019-04-15 DIAGNOSIS — F98.8 ATTENTION DEFICIT DISORDER, UNSPECIFIED HYPERACTIVITY PRESENCE: ICD-10-CM

## 2019-04-15 DIAGNOSIS — F33.0 MILD EPISODE OF RECURRENT MAJOR DEPRESSIVE DISORDER (H): ICD-10-CM

## 2019-04-15 RX ORDER — ESCITALOPRAM OXALATE 10 MG/1
TABLET ORAL
Qty: 60 TABLET | Refills: 0 | Status: CANCELLED | OUTPATIENT
Start: 2019-04-15

## 2019-04-16 NOTE — TELEPHONE ENCOUNTER
escitalopram (LEXAPRO) 10 MG tablet 60 tablet 0 11/8/2018  No   Sig: Half tab daily for 1st week, then 1 tab daily x 1 week, then 1 1/2 tabs daily for 1 week, then 2 tabs daily       PHQ-9 SCORE 2/13/2018 6/19/2018 11/8/2018   PHQ-9 Total Score 5 10 8     ~~~~~~~~~~~~~~~~~~~    Controlled Substance Refill Request for     amphetamine-dextroamphetamine (ADDERALL XR) 30 MG per 24 hr capsule 30 capsule 0 12/8/2018  No   Sig - Route: Take 1 capsule (30 mg) by mouth daily        amphetamine-dextroamphetamine (ADDERALL) 10 MG per tablet 30 tablet 0 12/8/2018  No   Sig: May take 1 tablet in the afternoon as needed       Problem List Complete:  Yes but no in depth details     Attention deficit disorder   Problem Detail     Noted:  2/15/2017   Priority:  Medium   Relevant Medications     amphetamine-dextroamphetamine (ADDERALL) 10 MG per tablet   amphetamine-dextroamphetamine (ADDERALL XR) 30 MG per 24 hr capsule   Last Encounter with Attention deficit disorder         Last Written Prescription Date:  12/08/2018  Last Fill Quantity: 30,   # refills: 0    THE MOST RECENT OFFICE VISIT MUST BE WITHIN THE PAST 3 MONTHS. AT LEAST ONE FACE TO FACE VISIT MUST OCCUR EVERY 6 MONTHS. ADDITIONAL VISITS CAN BE VIRTUAL.  (THIS STATEMENT SHOULD BE DELETED.)    Last Office Visit with [facility] primary care provider: 11/2018    Future Office visit:     Controlled substance agreement:   Encounter-Level CSA - 02/15/2017:    Controlled Substance Agreement - Scan on 2/24/2017  8:04 AM: CONTROLLED SUBSTANCE AGREEMENT (below)       Patient-Level CSA:    There are no patient-level csa.         Last Urine Drug Screen: No results found for: CDAUT, No results found for: COMDAT, No results found for: THC13, PCP13, COC13, MAMP13, OPI13, AMP13, BZO13, TCA13, MTD13, BAR13, OXY13, PPX13, BUP13     Processing:  Patient will  in clinic    [email or web address]   checked in past 3 months?  No, route to RN

## 2019-04-17 NOTE — TELEPHONE ENCOUNTER
Patient is followed by [facility] for ongoing prescription of stimulants.  All refills should be approved by this provider, or covering partner.    Medication(s): Adderall 30 mg.   Maximum quantity per month: 30  Clinic visit frequency required: Q 6  months     Controlled substance agreement on file: Yes       Date(s): 2/15/17  Neuropsych evaluation for ADD completed:  No    Last [facility] website verification:  done on 4/17/19 no concerns  [email or web address]    Patient is followed by [facility] for ongoing prescription of stimulants.  All refills should be approved by this provider, or covering partner.    Medication(s): Adderall 10 mg.   Maximum quantity per month: 30  Clinic visit frequency required: Q 6  months     Controlled substance agreement on file: Yes       Date(s): 2/15/17  Neuropsych evaluation for ADD completed:  No    Last [facility] website verification:  done on 4/17/19 no concerns  [email or web address]      Routing Lexapro refill request to provider for review/approval because:  Expected sig change.   Please review and authorize if appropriate.    Thank you,   Edgar LANDRY RN

## 2019-04-19 NOTE — TELEPHONE ENCOUNTER
Patient should have been weaned off Lexapro by now.  Please verify this with patient.  Please also inform patient that she is due for follow-up and schedule a visit.

## 2019-04-22 ENCOUNTER — MYC REFILL (OUTPATIENT)
Dept: FAMILY MEDICINE | Facility: CLINIC | Age: 39
End: 2019-04-22

## 2019-04-22 DIAGNOSIS — F98.8 ATTENTION DEFICIT DISORDER, UNSPECIFIED HYPERACTIVITY PRESENCE: ICD-10-CM

## 2019-04-22 DIAGNOSIS — F33.0 MILD EPISODE OF RECURRENT MAJOR DEPRESSIVE DISORDER (H): ICD-10-CM

## 2019-04-22 RX ORDER — DEXTROAMPHETAMINE SACCHARATE, AMPHETAMINE ASPARTATE, DEXTROAMPHETAMINE SULFATE AND AMPHETAMINE SULFATE 2.5; 2.5; 2.5; 2.5 MG/1; MG/1; MG/1; MG/1
TABLET ORAL
Qty: 30 TABLET | Refills: 0 | Status: CANCELLED | OUTPATIENT
Start: 2019-04-22

## 2019-04-22 RX ORDER — DEXTROAMPHETAMINE SACCHARATE, AMPHETAMINE ASPARTATE MONOHYDRATE, DEXTROAMPHETAMINE SULFATE AND AMPHETAMINE SULFATE 7.5; 7.5; 7.5; 7.5 MG/1; MG/1; MG/1; MG/1
30 CAPSULE, EXTENDED RELEASE ORAL DAILY
Qty: 30 CAPSULE | Refills: 0 | Status: CANCELLED | OUTPATIENT
Start: 2019-04-22

## 2019-04-23 NOTE — TELEPHONE ENCOUNTER
Controlled Substance Refill Request for     amphetamine-dextroamphetamine (ADDERALL XR) 30 MG per 24 hr capsule 30 capsule 0 12/8/2018  No   Sig - Route: Take 1 capsule (30 mg) by mouth daily        amphetamine-dextroamphetamine (ADDERALL) 10 MG per tablet 30 tablet 0 12/8/2018  No   Sig: May take 1 tablet in the afternoon as needed        Problem List Complete:  ADD listed but no details     Last Written Prescription Date:  12/08/2018  Last Fill Quantity: 30,   # refills: 0    THE MOST RECENT OFFICE VISIT MUST BE WITHIN THE PAST 3 MONTHS. AT LEAST ONE FACE TO FACE VISIT MUST OCCUR EVERY 6 MONTHS. ADDITIONAL VISITS CAN BE VIRTUAL.  (THIS STATEMENT SHOULD BE DELETED.)    [place] [facility]: 11/2018    Future Office visit:     Controlled substance agreement:   Encounter-Level CSA - 02/15/2017:    Controlled Substance Agreement - Scan on 2/24/2017  8:04 AM: CONTROLLED SUBSTANCE AGREEMENT (below)       Patient-Level CSA:    There are no patient-level csa.         Last Urine Drug Screen: No results found for: CDAUT, No results found for: COMDAT, No results found for: THC13, PCP13, COC13, MAMP13, OPI13, AMP13, BZO13, TCA13, MTD13, BAR13, OXY13, PPX13, BUP13     Processing:  Patient will  in clinic    [email or web address]   checked in past 3 months?  No, route to RN

## 2019-04-23 NOTE — TELEPHONE ENCOUNTER
"escitalopram (LEXAPRO) 10 MG tablet  Last Written Prescription Date:  11/8/18  Last Fill Quantity: 60,  # refills: 0   Last office visit: 11/8/2018 with prescribing provider:  Eren    Future Office Visit:        Requested Prescriptions   Pending Prescriptions Disp Refills     escitalopram (LEXAPRO) 10 MG tablet 60 tablet 0     Sig: Half tab daily for 1st week, then 1 tab daily x 1 week, then 1 1/2 tabs daily for 1 week, then 2 tabs daily       SSRIs Protocol Failed - 4/22/2019  1:42 PM        Failed - PHQ-9 score less than 5 in past 6 months     Please review last PHQ-9 score.           Passed - Medication is active on med list        Passed - Patient is age 18 or older        Passed - No active pregnancy on record        Passed - No positive pregnancy test in last 12 months        Passed - Recent (6 mo) or future (30 days) visit within the authorizing provider's specialty     Patient had office visit in the last 6 months or has a visit in the next 30 days with authorizing provider or within the authorizing provider's specialty.  See \"Patient Info\" tab in inbasket, or \"Choose Columns\" in Meds & Orders section of the refill encounter.              "

## 2019-04-23 NOTE — TELEPHONE ENCOUNTER
Left message asking patient to call back   1) to notify due for OV  2) to verify whether pt successfully weaned off Lexapro     Jennifer JOSHI RN

## 2019-04-23 NOTE — TELEPHONE ENCOUNTER
Pt calling to follow up on this.  Please call her when it is ready. [phone]  Ok to leave a message

## 2019-04-24 RX ORDER — ESCITALOPRAM OXALATE 10 MG/1
TABLET ORAL
Qty: 180 TABLET | Refills: 0 | Status: CANCELLED | OUTPATIENT
Start: 2019-04-24

## 2019-04-24 NOTE — TELEPHONE ENCOUNTER
Routing refill request to provider for review/approval because:  PHQ-9 score not less than 5 in the past 6 months    Please review and authorize if appropriate.    Thank you,   Edgar LANDRY RN

## 2019-04-24 NOTE — TELEPHONE ENCOUNTER
Pt called back and reports that she did not mean to order lexapro. She is not taking this anymore.    Pt states that she does not have a job right now and does not have insurance. Pt is hoping that  will refill the adderall for May and she will try to come in for an office visit in June.    Pt will  the adderall scripts when ready. Please call her. Pt would like a detailed message left with plan if she is not able to answer.

## 2019-04-24 NOTE — TELEPHONE ENCOUNTER
Patient is followed by [facility] for ongoing prescription of stimulants.  All refills should be approved by this provider, or covering partner.    Medication(s): Adderall 30 mg.   Maximum quantity per month: 30  Clinic visit frequency required: Q 6  months     Controlled substance agreement on file: Yes       Date(s): 2/15/17  Neuropsych evaluation for ADD completed:  No    Last [facility] website verification:  done on 4/24/19 no concerns  [email or web address]    Patient is followed by [facility] for ongoing prescription of stimulants.  All refills should be approved by this provider, or covering partner.    Medication(s): Adderall 10 mg.   Maximum quantity per month: 30  Clinic visit frequency required: Q 6  months     Controlled substance agreement on file: Yes       Date(s): 2/15/17  Neuropsych evaluation for ADD completed:  No    Last [facility] website verification:  done on 4/24/19 no concerns  [email or web address]

## 2019-04-25 RX ORDER — DEXTROAMPHETAMINE SACCHARATE, AMPHETAMINE ASPARTATE MONOHYDRATE, DEXTROAMPHETAMINE SULFATE AND AMPHETAMINE SULFATE 7.5; 7.5; 7.5; 7.5 MG/1; MG/1; MG/1; MG/1
30 CAPSULE, EXTENDED RELEASE ORAL DAILY
Qty: 30 CAPSULE | Refills: 0 | Status: SHIPPED | OUTPATIENT
Start: 2019-04-25 | End: 2019-06-06

## 2019-04-25 RX ORDER — DEXTROAMPHETAMINE SACCHARATE, AMPHETAMINE ASPARTATE, DEXTROAMPHETAMINE SULFATE AND AMPHETAMINE SULFATE 2.5; 2.5; 2.5; 2.5 MG/1; MG/1; MG/1; MG/1
TABLET ORAL
Qty: 30 TABLET | Refills: 0 | Status: SHIPPED | OUTPATIENT
Start: 2019-04-25 | End: 2019-06-06

## 2019-04-25 NOTE — TELEPHONE ENCOUNTER
Routing to [facility] to contact patient to inform due for appointment. Thank you.     Jennifer JOSHI RN

## 2019-06-06 ENCOUNTER — OFFICE VISIT (OUTPATIENT)
Dept: FAMILY MEDICINE | Facility: CLINIC | Age: 39
End: 2019-06-06

## 2019-06-06 VITALS
DIASTOLIC BLOOD PRESSURE: 77 MMHG | BODY MASS INDEX: 25.66 KG/M2 | WEIGHT: 154 LBS | HEIGHT: 65 IN | HEART RATE: 83 BPM | SYSTOLIC BLOOD PRESSURE: 113 MMHG | TEMPERATURE: 99.1 F | OXYGEN SATURATION: 96 %

## 2019-06-06 DIAGNOSIS — F33.0 MILD EPISODE OF RECURRENT MAJOR DEPRESSIVE DISORDER (H): ICD-10-CM

## 2019-06-06 DIAGNOSIS — F98.8 ATTENTION DEFICIT DISORDER, UNSPECIFIED HYPERACTIVITY PRESENCE: Primary | ICD-10-CM

## 2019-06-06 DIAGNOSIS — L98.9 FACIAL SKIN LESION: ICD-10-CM

## 2019-06-06 PROCEDURE — 99214 OFFICE O/P EST MOD 30 MIN: CPT | Performed by: INTERNAL MEDICINE

## 2019-06-06 RX ORDER — DEXTROAMPHETAMINE SACCHARATE, AMPHETAMINE ASPARTATE MONOHYDRATE, DEXTROAMPHETAMINE SULFATE AND AMPHETAMINE SULFATE 7.5; 7.5; 7.5; 7.5 MG/1; MG/1; MG/1; MG/1
30 CAPSULE, EXTENDED RELEASE ORAL DAILY
Qty: 30 CAPSULE | Refills: 0 | Status: SHIPPED | OUTPATIENT
Start: 2019-07-06 | End: 2019-12-20

## 2019-06-06 RX ORDER — DEXTROAMPHETAMINE SACCHARATE, AMPHETAMINE ASPARTATE MONOHYDRATE, DEXTROAMPHETAMINE SULFATE AND AMPHETAMINE SULFATE 7.5; 7.5; 7.5; 7.5 MG/1; MG/1; MG/1; MG/1
30 CAPSULE, EXTENDED RELEASE ORAL DAILY
Qty: 30 CAPSULE | Refills: 0 | Status: SHIPPED | OUTPATIENT
Start: 2019-06-06 | End: 2019-11-03

## 2019-06-06 RX ORDER — DEXTROAMPHETAMINE SACCHARATE, AMPHETAMINE ASPARTATE, DEXTROAMPHETAMINE SULFATE AND AMPHETAMINE SULFATE 2.5; 2.5; 2.5; 2.5 MG/1; MG/1; MG/1; MG/1
TABLET ORAL
Qty: 30 TABLET | Refills: 0 | Status: SHIPPED | OUTPATIENT
Start: 2019-06-06 | End: 2019-12-20

## 2019-06-06 RX ORDER — DEXTROAMPHETAMINE SACCHARATE, AMPHETAMINE ASPARTATE MONOHYDRATE, DEXTROAMPHETAMINE SULFATE AND AMPHETAMINE SULFATE 7.5; 7.5; 7.5; 7.5 MG/1; MG/1; MG/1; MG/1
30 CAPSULE, EXTENDED RELEASE ORAL DAILY
Qty: 30 CAPSULE | Refills: 0 | Status: SHIPPED | OUTPATIENT
Start: 2019-08-06 | End: 2019-12-20

## 2019-06-06 RX ORDER — DEXTROAMPHETAMINE SACCHARATE, AMPHETAMINE ASPARTATE, DEXTROAMPHETAMINE SULFATE AND AMPHETAMINE SULFATE 2.5; 2.5; 2.5; 2.5 MG/1; MG/1; MG/1; MG/1
TABLET ORAL
Qty: 30 TABLET | Refills: 0 | Status: SHIPPED | OUTPATIENT
Start: 2019-08-06 | End: 2019-11-03

## 2019-06-06 RX ORDER — DEXTROAMPHETAMINE SACCHARATE, AMPHETAMINE ASPARTATE, DEXTROAMPHETAMINE SULFATE AND AMPHETAMINE SULFATE 2.5; 2.5; 2.5; 2.5 MG/1; MG/1; MG/1; MG/1
TABLET ORAL
Qty: 30 TABLET | Refills: 0 | Status: SHIPPED | OUTPATIENT
Start: 2019-07-06 | End: 2019-12-20

## 2019-06-06 ASSESSMENT — MIFFLIN-ST. JEOR: SCORE: 1361.48

## 2019-06-06 ASSESSMENT — PATIENT HEALTH QUESTIONNAIRE - PHQ9: SUM OF ALL RESPONSES TO PHQ QUESTIONS 1-9: 7

## 2019-06-06 NOTE — LETTER
My Depression Action Plan  Name: Nicki Frey   Date of Birth 1980  Date: 6/14/2019    My doctor: Randall Jason   My clinic: [place] [address] [facility] [address]  [phone]          GREEN    ZONE   Good Control    What it looks like:     Things are going generally well. You have normal up s and down s. You may even feel depressed from time to time, but bad moods usually last less than a day.   What you need to do:  1. Continue to care for yourself (see self care plan)  2. Check your depression survival kit and update it as needed  3. Follow your physician s recommendations including any medication.  4. Do not stop taking medication unless you consult with your physician first.           YELLOW         ZONE Getting Worse    What it looks like:     Depression is starting to interfere with your life.     It may be hard to get out of bed; you may be starting to isolate yourself from others.    Symptoms of depression are starting to last most all day and this has happened for several days.     You may have suicidal thoughts but they are not constant.   What you need to do:     1. Call your care team, your response to treatment will improve if you keep your care team informed of your progress. Yellow periods are signs an adjustment may need to be made.     2. Continue your self-care, even if you have to fake it!    3. Talk to someone in your support network    4. Open up your depression survival kit           RED    ZONE Medical Alert - Get Help    What it looks like:     Depression is seriously interfering with your life.     You may experience these or other symptoms: You can t get out of bed most days, can t work or engage in other necessary activities, you have trouble taking care of basic hygiene, or basic responsibilities, thoughts of suicide or death that will not go away, self-injurious behavior.     What you need to do:  1. Call your care  team and request a same-day appointment. If they are not available (weekends or after hours) call your local crisis line, emergency room or 911.            Depression Self Care Plan / Survival Kit    Self-Care for Depression  Here s the deal. Your body and mind are really not as separate as most people think.  What you do and think affects how you feel and how you feel influences what you do and think. This means if you do things that people who feel good do, it will help you feel better.  Sometimes this is all it takes.  There is also a place for medication and therapy depending on how severe your depression is, so be sure to consult with your medical provider and/ or Behavioral Health Consultant if your symptoms are worsening or not improving.     In order to better manage my stress, I will:    Exercise  Get some form of exercise, every day. This will help reduce pain and release endorphins, the  feel good  chemicals in your brain. This is almost as good as taking antidepressants!  This is not the same as joining a gym and then never going! (they count on that by the way ) It can be as simple as just going for a walk or doing some gardening, anything that will get you moving.      Hygiene   Maintain good hygiene (Get out of bed in the morning, Make your bed, Brush your teeth, Take a shower, and Get dressed like you were going to work, even if you are unemployed).  If your clothes don't fit try to get ones that do.    Diet  I will strive to eat foods that are good for me, drink plenty of water, and avoid excessive sugar, caffeine, alcohol, and other mood-altering substances.  Some foods that are helpful in depression are: complex carbohydrates, B vitamins, flaxseed, fish or fish oil, fresh fruits and vegetables.    Psychotherapy  I agree to participate in Individual Therapy (if recommended).    Medication  If prescribed medications, I agree to take them.  Missing doses can result in serious side effects.  I  understand that drinking alcohol, or other illicit drug use, may cause potential side effects.  I will not stop my medication abruptly without first discussing it with my provider.    Staying Connected With Others  I will stay in touch with my friends, family members, and my primary care provider/team.    Use your imagination  Be creative.  We all have a creative side; it doesn t matter if it s oil painting, sand castles, or mud pies! This will also kick up the endorphins.    Witness Beauty  (AKA stop and smell the roses) Take a look outside, even in mid-winter. Notice colors, textures. Watch the squirrels and birds.     Service to others  Be of service to others.  There is always someone else in need.  By helping others we can  get out of ourselves  and remember the really important things.  This also provides opportunities for practicing all the other parts of the program.    Humor  Laugh and be silly!  Adjust your TV habits for less news and crime-drama and more comedy.    Control your stress  Try breathing deep, massage therapy, biofeedback, and meditation. Find time to relax each day.     My support system    Clinic Contact:  Phone number:    Contact 1:  Phone number:    Contact 2:  Phone number:    [facility]/:  Phone number:    Therapist:  Phone number:    Local crisis center:    Phone number:    Other community support:  Phone number:

## 2019-06-06 NOTE — PROGRESS NOTES
"Subjective     Nicki Frey is a 38 year old female who presents to clinic today for the following health issues:      Medication Followup of Adderall    Taking Medication as prescribed: yes    Side Effects:  None    Medication Helping Symptoms:  yes       I last saw the patient at the clinic on 11/08/2018 for her depression and attention deficit disorder.    Her attention deficit continues to be controlled on her current regimen of Adderall 30 mg XR in the morning and 10 mg regular release in the afternoon.    On her previous visit, I restarted her on Lexapro as she was struggling more with her depression.  She is feeling better now and is off Lexapro.  No longer experiencing her previous stressors at work.  PHQ9 score today is 7.      Past Medical History:   Diagnosis Date     Attention deficit disorder        Review of Systems   Constitutional: Negative for fatigue.   Eyes: Negative for visual disturbance.   Respiratory: Negative for shortness of breath.    Cardiovascular: Negative for chest pain and palpitations.   Gastrointestinal: Negative for abdominal pain, constipation, diarrhea, nausea and vomiting.   Musculoskeletal: Negative for myalgias.   Neurological: Negative for dizziness, light-headedness and headaches.   Psychiatric/Behavioral: Negative for dysphoric mood, hallucinations, self-injury and suicidal ideas. The patient is not nervous/anxious.        /77 (BP Location: Left arm, Patient Position: Chair, Cuff Size: Adult Regular)   Pulse 83   Temp 99.1  F (37.3  C) (Tympanic)   Ht 1.638 m (5' 4.5\")   Wt 69.9 kg (154 lb)   SpO2 96%   BMI 26.03 kg/m      Physical Exam   Constitutional: She is oriented to person, place, and time. No distress.   Neck: No thyromegaly present.   Cardiovascular: Normal rate, regular rhythm and normal heart sounds.   Pulmonary/Chest: Effort normal and breath sounds normal. No respiratory distress.   Abdominal: Soft. There is no tenderness.   Neurological: She is " alert and oriented to person, place, and time. Coordination normal.   No tremors   Skin:   (+) papular lesion at face   Psychiatric: She has a normal mood and affect.   Vitals reviewed.        ICD-10-CM    1. Attention deficit disorder, unspecified hyperactivity presence F98.8 amphetamine-dextroamphetamine (ADDERALL XR) 30 MG 24 hr capsule     amphetamine-dextroamphetamine (ADDERALL) 10 MG tablet     amphetamine-dextroamphetamine (ADDERALL) 10 MG tablet     amphetamine-dextroamphetamine (ADDERALL XR) 30 MG 24 hr capsule     amphetamine-dextroamphetamine (ADDERALL XR) 30 MG 24 hr capsule     amphetamine-dextroamphetamine (ADDERALL) 10 MG tablet   2. Mild episode of recurrent major depressive disorder (H) F33.0 DEPRESSION ACTION PLAN (DAP)   3. Facial skin lesion L98.9 DERMATOLOGY REFERRAL       Patient Instructions   Call doctor if your depression or attention deficit worsens, or if you develop any new symptoms.

## 2019-06-14 ASSESSMENT — ENCOUNTER SYMPTOMS
NAUSEA: 0
NERVOUS/ANXIOUS: 0
FATIGUE: 0
VOMITING: 0
DIZZINESS: 0
DYSPHORIC MOOD: 0
CONSTIPATION: 0
LIGHT-HEADEDNESS: 0
ABDOMINAL PAIN: 0
MYALGIAS: 0
SHORTNESS OF BREATH: 0
HALLUCINATIONS: 0
HEADACHES: 0
DIARRHEA: 0
PALPITATIONS: 0

## 2019-11-03 ENCOUNTER — MYC REFILL (OUTPATIENT)
Dept: FAMILY MEDICINE | Facility: CLINIC | Age: 39
End: 2019-11-03

## 2019-11-03 DIAGNOSIS — F98.8 ATTENTION DEFICIT DISORDER, UNSPECIFIED HYPERACTIVITY PRESENCE: ICD-10-CM

## 2019-11-03 RX ORDER — DEXTROAMPHETAMINE SACCHARATE, AMPHETAMINE ASPARTATE, DEXTROAMPHETAMINE SULFATE AND AMPHETAMINE SULFATE 2.5; 2.5; 2.5; 2.5 MG/1; MG/1; MG/1; MG/1
TABLET ORAL
Qty: 30 TABLET | Refills: 0 | Status: CANCELLED | OUTPATIENT
Start: 2019-11-03

## 2019-11-03 RX ORDER — DEXTROAMPHETAMINE SACCHARATE, AMPHETAMINE ASPARTATE MONOHYDRATE, DEXTROAMPHETAMINE SULFATE AND AMPHETAMINE SULFATE 7.5; 7.5; 7.5; 7.5 MG/1; MG/1; MG/1; MG/1
30 CAPSULE, EXTENDED RELEASE ORAL DAILY
Qty: 30 CAPSULE | Refills: 0 | Status: CANCELLED | OUTPATIENT
Start: 2019-11-03

## 2019-11-05 RX ORDER — DEXTROAMPHETAMINE SACCHARATE, AMPHETAMINE ASPARTATE, DEXTROAMPHETAMINE SULFATE AND AMPHETAMINE SULFATE 2.5; 2.5; 2.5; 2.5 MG/1; MG/1; MG/1; MG/1
TABLET ORAL
Qty: 30 TABLET | Refills: 0 | Status: SHIPPED | OUTPATIENT
Start: 2019-11-05 | End: 2019-12-19

## 2019-11-05 RX ORDER — DEXTROAMPHETAMINE SACCHARATE, AMPHETAMINE ASPARTATE MONOHYDRATE, DEXTROAMPHETAMINE SULFATE AND AMPHETAMINE SULFATE 7.5; 7.5; 7.5; 7.5 MG/1; MG/1; MG/1; MG/1
30 CAPSULE, EXTENDED RELEASE ORAL DAILY
Qty: 30 CAPSULE | Refills: 0 | Status: SHIPPED | OUTPATIENT
Start: 2019-11-05 | End: 2019-12-19

## 2019-11-05 NOTE — TELEPHONE ENCOUNTER
Patient was notified on her VM that her Rxs will now be e-prescribed instead of local print/.    Due for OV December 2019.    To PCP for review and send.  Thank you,  Tiara Brody RN on 11/5/2019 at 5:07 PM

## 2019-11-05 NOTE — TELEPHONE ENCOUNTER
Controlled Substance Refill Request for       amphetamine-dextroamphetamine (ADDERALL) 10 MG tablet 30 tablet 0 8/6/2019  No   Sig: May take 1 tablet in the afternoon as needed.       amphetamine-dextroamphetamine (ADDERALL XR) 30 MG 24 hr capsule 30 capsule 0 8/6/2019  No   Sig - Route: Take 1 capsule (30 mg) by mouth daily      BOTH Last Written Prescription Date:  08/06/2019  Last Fill Quantity: 30,  # refills: 0   Last office visit: 6/6/2019 with prescribing provider:     Future Office Visit:      Problem List Complete:  Is listed but no detail     THE MOST RECENT OFFICE VISIT MUST BE WITHIN THE PAST 3 MONTHS. AT LEAST ONE FACE TO FACE VISIT MUST OCCUR EVERY 6 MONTHS. ADDITIONAL VISITS CAN BE VIRTUAL.  (THIS STATEMENT SHOULD BE DELETED.)    Future Office visit:     Controlled substance agreement:   Encounter-Level CSA - 02/15/2017:    Controlled Substance Agreement - Scan on 2/24/2017  8:04 AM: CONTROLLED SUBSTANCE AGREEMENT     Patient-Level CSA:    There are no patient-level csa.         Last Urine Drug Screen: No results found for: CDAUT, No results found for: COMDAT, No results found for: THC13, PCP13, COC13, MAMP13, OPI13, AMP13, BZO13, TCA13, MTD13, BAR13, OXY13, PPX13, BUP13     Processing:  RN directed    [email or web address]   checked in past 3 months?  No, route to RN

## 2020-02-02 ENCOUNTER — MYC REFILL (OUTPATIENT)
Dept: FAMILY MEDICINE | Facility: CLINIC | Age: 40
End: 2020-02-02

## 2020-02-02 DIAGNOSIS — F98.8 ATTENTION DEFICIT DISORDER, UNSPECIFIED HYPERACTIVITY PRESENCE: ICD-10-CM

## 2020-02-03 NOTE — TELEPHONE ENCOUNTER
Controlled Substance Refill Request for   amphetamine-dextroamphetamine (ADDERALL XR) 30 MG 24 hr capsule 30 capsule 0 12/23/2019     amphetamine-dextroamphetamine (ADDERALL) 10 MG tablet 30 tablet 0 12/23/2019         Problem List Complete:  No     PROVIDER TO CONSIDER COMPLETION OF PROBLEM LIST AND OVERVIEW/CONTROLLED SUBSTANCE AGREEMENT    Last Written Prescription Date:  12/23/2019  Last Fill Quantity: 30,   # refills: 0    THE MOST RECENT OFFICE VISIT MUST BE WITHIN THE PAST 3 MONTHS. AT LEAST ONE FACE TO FACE VISIT MUST OCCUR EVERY 6 MONTHS. ADDITIONAL VISITS CAN BE VIRTUAL.  (THIS STATEMENT SHOULD BE DELETED.)    Last Office Visit with [facility] [place] primary care provider: 06/06/2019    Future Office visit: Unknown    Controlled substance agreement:   Encounter-Level CSA - 02/15/2017:    Controlled Substance Agreement - Scan on 2/24/2017  8:04 AM: CONTROLLED SUBSTANCE AGREEMENT     Patient-Level CSA:    There are no patient-level csa.         Last Urine Drug Screen: No results found for: CDAUT, No results found for: COMDAT, No results found for: THC13, PCP13, COC13, MAMP13, OPI13, AMP13, BZO13, TCA13, MTD13, BAR13, OXY13, PPX13, BUP13     Processing:  Rx to be electronically transmitted to pharmacy by provider      [email or web address]       checked in past 3 months?  No, route to RN

## 2020-02-04 PROBLEM — F98.8 ATTENTION DEFICIT DISORDER: Status: ACTIVE | Noted: 2017-02-15

## 2020-02-04 NOTE — TELEPHONE ENCOUNTER
Patient is followed by [facility] for ongoing prescription of stimulants.  All refills should be approved by this provider, or covering partner.    Medication(s): Adderall 30 mg XR and Adderall 10 mg.   Maximum quantity per month: 30 each  Clinic visit frequency required: Q 6  months     Controlled substance agreement on file: Yes       Date(s): 2/15/17  Neuropsych evaluation for ADD completed:  No    Last [facility] website verification:  done on 2/4/20 no concerns  [email or web address]

## 2020-02-06 RX ORDER — DEXTROAMPHETAMINE SACCHARATE, AMPHETAMINE ASPARTATE MONOHYDRATE, DEXTROAMPHETAMINE SULFATE AND AMPHETAMINE SULFATE 7.5; 7.5; 7.5; 7.5 MG/1; MG/1; MG/1; MG/1
30 CAPSULE, EXTENDED RELEASE ORAL DAILY
Qty: 30 CAPSULE | Refills: 0 | Status: SHIPPED | OUTPATIENT
Start: 2020-02-06 | End: 2020-03-24

## 2020-02-06 RX ORDER — DEXTROAMPHETAMINE SACCHARATE, AMPHETAMINE ASPARTATE, DEXTROAMPHETAMINE SULFATE AND AMPHETAMINE SULFATE 2.5; 2.5; 2.5; 2.5 MG/1; MG/1; MG/1; MG/1
TABLET ORAL
Qty: 30 TABLET | Refills: 0 | Status: SHIPPED | OUTPATIENT
Start: 2020-02-06 | End: 2020-03-24

## 2020-03-10 ENCOUNTER — HEALTH MAINTENANCE LETTER (OUTPATIENT)
Age: 40
End: 2020-03-10

## 2020-03-24 ENCOUNTER — MYC REFILL (OUTPATIENT)
Dept: FAMILY MEDICINE | Facility: CLINIC | Age: 40
End: 2020-03-24

## 2020-03-24 DIAGNOSIS — F98.8 ATTENTION DEFICIT DISORDER, UNSPECIFIED HYPERACTIVITY PRESENCE: ICD-10-CM

## 2020-03-24 RX ORDER — DEXTROAMPHETAMINE SACCHARATE, AMPHETAMINE ASPARTATE MONOHYDRATE, DEXTROAMPHETAMINE SULFATE AND AMPHETAMINE SULFATE 7.5; 7.5; 7.5; 7.5 MG/1; MG/1; MG/1; MG/1
30 CAPSULE, EXTENDED RELEASE ORAL DAILY
Qty: 30 CAPSULE | Refills: 0 | Status: SHIPPED | OUTPATIENT
Start: 2020-03-24 | End: 2020-09-24

## 2020-03-24 RX ORDER — DEXTROAMPHETAMINE SACCHARATE, AMPHETAMINE ASPARTATE, DEXTROAMPHETAMINE SULFATE AND AMPHETAMINE SULFATE 2.5; 2.5; 2.5; 2.5 MG/1; MG/1; MG/1; MG/1
TABLET ORAL
Qty: 30 TABLET | Refills: 0 | Status: SHIPPED | OUTPATIENT
Start: 2020-03-24 | End: 2020-09-24

## 2020-03-24 NOTE — TELEPHONE ENCOUNTER
Patient is followed by [facility] for ongoing prescription of stimulants.  All refills should be approved by this provider, or covering partner.     Medication(s): Adderall 30 mg XR and Adderall 10 mg.   Maximum quantity per month: 30 each  Clinic visit frequency required: Q 6  months      Controlled substance agreement on file: Yes       Date(s): 2/15/17  Neuropsych evaluation for ADD completed:  No     Last [facility] website verification:  done on 2/4/20   [email or web address]    Last RX's 2/6/2020    Needs visit- [employer] message sent to schedule Telephone Visit with PCP

## 2020-05-29 ENCOUNTER — VIRTUAL VISIT (OUTPATIENT)
Dept: FAMILY MEDICINE | Facility: CLINIC | Age: 40
End: 2020-05-29

## 2020-05-29 DIAGNOSIS — F98.8 ATTENTION DEFICIT DISORDER, UNSPECIFIED HYPERACTIVITY PRESENCE: Primary | ICD-10-CM

## 2020-05-29 PROCEDURE — 99213 OFFICE O/P EST LOW 20 MIN: CPT | Mod: 95 | Performed by: INTERNAL MEDICINE

## 2020-05-29 ASSESSMENT — PATIENT HEALTH QUESTIONNAIRE - PHQ9: SUM OF ALL RESPONSES TO PHQ QUESTIONS 1-9: 4

## 2020-05-29 NOTE — PROGRESS NOTES
"Nicki Frey is a 39 year old female who is being evaluated via a billable video visit.      The patient has been notified of following:     \"This video visit will be conducted via a call between you and your physician/provider. We have found that certain health care needs can be provided without the need for an in-person physical exam.  This service lets us provide the care you need with a video conversation.  If a prescription is necessary we can send it directly to your pharmacy.  If lab work is needed we can place an order for that and you can then stop by our lab to have the test done at a later time.    Video visits are billed at different rates depending on your insurance coverage.  Please reach out to your insurance provider with any questions.    If during the course of the call the physician/provider feels a video visit is not appropriate, you will not be charged for this service.\"    Patient has given verbal consent for Video visit? Yes    How would you like to obtain your AVS? [place]    Patient would like the video invitation sent by: Text to cell phone: [phone]    Will anyone else be joining your video visit? No      Video-Visit Details    Video Start Time: 3:53 pm  Video End Time: 3:58 pm    Originating Location (pt. Location): home    Distant Location (provider location):  [facility]     Platform used for Video Visit: groopify (Am.Well)      HPI  Medication Followup     Taking Medication as prescribed: yes    Side Effects:  None         Patient's attention deficit disorder continues to do well on Adderall XR 30 mg in the morning and Adderall immediate release 10 mg as needed in the afternoon.    No other subjective complaints presented.      Review of Systems   Constitutional: Negative for fatigue.   Eyes: Negative for visual disturbance.   Respiratory: Negative for shortness of breath.    Cardiovascular: Negative for chest pain and palpitations.   Gastrointestinal: " Negative for abdominal pain, constipation, diarrhea, nausea and vomiting.   Musculoskeletal: Negative for myalgias.   Neurological: Negative for dizziness, light-headedness and headaches.   Psychiatric/Behavioral: Negative for dysphoric mood, hallucinations, self-injury and suicidal ideas. The patient is not nervous/anxious.          ICD-10-CM    1. Attention deficit disorder, unspecified hyperactivity presence  F98.8 amphetamine-dextroamphetamine (ADDERALL XR) 30 MG 24 hr capsule     amphetamine-dextroamphetamine (ADDERALL XR) 30 MG 24 hr capsule     amphetamine-dextroamphetamine (ADDERALL XR) 30 MG 24 hr capsule     amphetamine-dextroamphetamine (ADDERALL) 10 MG tablet     amphetamine-dextroamphetamine (ADDERALL) 10 MG tablet     amphetamine-dextroamphetamine (ADDERALL) 10 MG tablet       Dr. Randall Jason

## 2020-06-03 ENCOUNTER — MYC REFILL (OUTPATIENT)
Dept: FAMILY MEDICINE | Facility: CLINIC | Age: 40
End: 2020-06-03

## 2020-06-03 DIAGNOSIS — F98.8 ATTENTION DEFICIT DISORDER, UNSPECIFIED HYPERACTIVITY PRESENCE: ICD-10-CM

## 2020-06-03 RX ORDER — DEXTROAMPHETAMINE SACCHARATE, AMPHETAMINE ASPARTATE, DEXTROAMPHETAMINE SULFATE AND AMPHETAMINE SULFATE 2.5; 2.5; 2.5; 2.5 MG/1; MG/1; MG/1; MG/1
10 TABLET ORAL DAILY
Qty: 30 TABLET | Refills: 0 | Status: SHIPPED | OUTPATIENT
Start: 2020-08-04 | End: 2020-09-03

## 2020-06-03 RX ORDER — DEXTROAMPHETAMINE SACCHARATE, AMPHETAMINE ASPARTATE, DEXTROAMPHETAMINE SULFATE AND AMPHETAMINE SULFATE 2.5; 2.5; 2.5; 2.5 MG/1; MG/1; MG/1; MG/1
TABLET ORAL
Qty: 30 TABLET | Refills: 0 | Status: CANCELLED | OUTPATIENT
Start: 2020-06-03

## 2020-06-03 RX ORDER — DEXTROAMPHETAMINE SACCHARATE, AMPHETAMINE ASPARTATE MONOHYDRATE, DEXTROAMPHETAMINE SULFATE AND AMPHETAMINE SULFATE 7.5; 7.5; 7.5; 7.5 MG/1; MG/1; MG/1; MG/1
30 CAPSULE, EXTENDED RELEASE ORAL DAILY
Qty: 30 CAPSULE | Refills: 0 | Status: SHIPPED | OUTPATIENT
Start: 2020-06-03 | End: 2020-07-03

## 2020-06-03 RX ORDER — DEXTROAMPHETAMINE SACCHARATE, AMPHETAMINE ASPARTATE MONOHYDRATE, DEXTROAMPHETAMINE SULFATE AND AMPHETAMINE SULFATE 7.5; 7.5; 7.5; 7.5 MG/1; MG/1; MG/1; MG/1
30 CAPSULE, EXTENDED RELEASE ORAL DAILY
Qty: 30 CAPSULE | Refills: 0 | Status: SHIPPED | OUTPATIENT
Start: 2020-08-04 | End: 2020-09-03

## 2020-06-03 RX ORDER — DEXTROAMPHETAMINE SACCHARATE, AMPHETAMINE ASPARTATE MONOHYDRATE, DEXTROAMPHETAMINE SULFATE AND AMPHETAMINE SULFATE 7.5; 7.5; 7.5; 7.5 MG/1; MG/1; MG/1; MG/1
30 CAPSULE, EXTENDED RELEASE ORAL DAILY
Qty: 30 CAPSULE | Refills: 0 | Status: SHIPPED | OUTPATIENT
Start: 2020-07-04 | End: 2020-08-03

## 2020-06-03 RX ORDER — DEXTROAMPHETAMINE SACCHARATE, AMPHETAMINE ASPARTATE, DEXTROAMPHETAMINE SULFATE AND AMPHETAMINE SULFATE 2.5; 2.5; 2.5; 2.5 MG/1; MG/1; MG/1; MG/1
10 TABLET ORAL DAILY
Qty: 30 TABLET | Refills: 0 | Status: SHIPPED | OUTPATIENT
Start: 2020-06-03 | End: 2020-07-03

## 2020-06-03 RX ORDER — DEXTROAMPHETAMINE SACCHARATE, AMPHETAMINE ASPARTATE, DEXTROAMPHETAMINE SULFATE AND AMPHETAMINE SULFATE 2.5; 2.5; 2.5; 2.5 MG/1; MG/1; MG/1; MG/1
10 TABLET ORAL DAILY
Qty: 30 TABLET | Refills: 0 | Status: SHIPPED | OUTPATIENT
Start: 2020-07-04 | End: 2020-08-03

## 2020-06-03 RX ORDER — DEXTROAMPHETAMINE SACCHARATE, AMPHETAMINE ASPARTATE MONOHYDRATE, DEXTROAMPHETAMINE SULFATE AND AMPHETAMINE SULFATE 7.5; 7.5; 7.5; 7.5 MG/1; MG/1; MG/1; MG/1
30 CAPSULE, EXTENDED RELEASE ORAL DAILY
Qty: 30 CAPSULE | Refills: 0 | Status: CANCELLED | OUTPATIENT
Start: 2020-06-03

## 2020-06-03 ASSESSMENT — ENCOUNTER SYMPTOMS
DYSPHORIC MOOD: 0
HEADACHES: 0
ABDOMINAL PAIN: 0
SHORTNESS OF BREATH: 0
DIZZINESS: 0
LIGHT-HEADEDNESS: 0
PALPITATIONS: 0
NERVOUS/ANXIOUS: 0
CONSTIPATION: 0
VOMITING: 0
DIARRHEA: 0
HALLUCINATIONS: 0
FATIGUE: 0
NAUSEA: 0
MYALGIAS: 0

## 2020-06-03 NOTE — TELEPHONE ENCOUNTER
Called patient and advised Rx's have been sent to pharmacy for June, July and August.  While on phone with patient she was alerted by [facility] a Rx was ready for .    SHANEKA Almendarez, RN  [employer] Triage

## 2020-06-03 NOTE — TELEPHONE ENCOUNTER
Reason for Call:  Medication or medication refill:    Do you use a [facility]?  Name of the pharmacy and phone number for the current request:  [id number] [place] [phone] AT [facility]      Name of the medication requested: Adderall both doses listed in mychart message    Other request: pt had OV to get these refilled last Friday. Pt confused why the pharmacy doesn't have it. She is leaving on vacation and needs this asap    Can we leave a detailed message on this number? YES    Phone number patient can be reached at: Home number on file [phone] (home)    Best Time: any    Call taken on 6/3/2020 at 2:23 PM by Meet Castellano

## 2020-11-01 ENCOUNTER — MYC REFILL (OUTPATIENT)
Dept: FAMILY MEDICINE | Facility: CLINIC | Age: 40
End: 2020-11-01

## 2020-11-01 DIAGNOSIS — F98.8 ATTENTION DEFICIT DISORDER, UNSPECIFIED HYPERACTIVITY PRESENCE: ICD-10-CM

## 2020-11-03 RX ORDER — DEXTROAMPHETAMINE SACCHARATE, AMPHETAMINE ASPARTATE MONOHYDRATE, DEXTROAMPHETAMINE SULFATE AND AMPHETAMINE SULFATE 7.5; 7.5; 7.5; 7.5 MG/1; MG/1; MG/1; MG/1
30 CAPSULE, EXTENDED RELEASE ORAL DAILY
Qty: 30 CAPSULE | Refills: 0 | Status: SHIPPED | OUTPATIENT
Start: 2020-11-03 | End: 2021-03-03

## 2020-11-03 RX ORDER — DEXTROAMPHETAMINE SACCHARATE, AMPHETAMINE ASPARTATE, DEXTROAMPHETAMINE SULFATE AND AMPHETAMINE SULFATE 2.5; 2.5; 2.5; 2.5 MG/1; MG/1; MG/1; MG/1
TABLET ORAL
Qty: 30 TABLET | Refills: 0 | Status: SHIPPED | OUTPATIENT
Start: 2020-11-03 | End: 2021-03-03

## 2020-11-03 NOTE — TELEPHONE ENCOUNTER
Marguerite Aguilar APRN CNP  You 22 minutes ago (11:15 AM)     I will let Dr Mendiola handle this refill    Message text

## 2020-11-03 NOTE — TELEPHONE ENCOUNTER
Due for OV with new provider in 's absence.    Pended one month with note to patient to call and schedule prior to next refill.    Thank you,  Tiara Brody RN on 11/3/2020 at 10:29 AM

## 2020-12-27 ENCOUNTER — HEALTH MAINTENANCE LETTER (OUTPATIENT)
Age: 40
End: 2020-12-27

## 2021-03-03 ENCOUNTER — MYC REFILL (OUTPATIENT)
Dept: FAMILY MEDICINE | Facility: CLINIC | Age: 41
End: 2021-03-03

## 2021-03-03 DIAGNOSIS — F98.8 ATTENTION DEFICIT DISORDER, UNSPECIFIED HYPERACTIVITY PRESENCE: ICD-10-CM

## 2021-03-05 RX ORDER — DEXTROAMPHETAMINE SACCHARATE, AMPHETAMINE ASPARTATE MONOHYDRATE, DEXTROAMPHETAMINE SULFATE AND AMPHETAMINE SULFATE 7.5; 7.5; 7.5; 7.5 MG/1; MG/1; MG/1; MG/1
30 CAPSULE, EXTENDED RELEASE ORAL DAILY
Qty: 30 CAPSULE | Refills: 0 | Status: SHIPPED | OUTPATIENT
Start: 2021-03-05

## 2021-03-05 RX ORDER — DEXTROAMPHETAMINE SACCHARATE, AMPHETAMINE ASPARTATE, DEXTROAMPHETAMINE SULFATE AND AMPHETAMINE SULFATE 2.5; 2.5; 2.5; 2.5 MG/1; MG/1; MG/1; MG/1
TABLET ORAL
Qty: 30 TABLET | Refills: 0 | Status: SHIPPED | OUTPATIENT
Start: 2021-03-05

## 2021-03-05 NOTE — TELEPHONE ENCOUNTER
Patient is followed by VIVEK [facility]CEO for ongoing prescription of stimulants.  All refills should be approved by this provider, or covering partner.    Medication(s): Adderall 30 mg XR and Adderall 10 mg.   Maximum quantity per month: 30 each  Clinic visit frequency required: Q 6  months     Controlled substance agreement on file: Yes       Date(s): 2/15/17  Neuropsych evaluation for ADD completed:  No    Last [facility] website verification: done 3/5/21   [email or web address]        TCs: Please call pt to help schedule appt with new PCP and route back to triage.     Thank you,  Edgar GUZMAN RN

## 2021-04-24 ENCOUNTER — HEALTH MAINTENANCE LETTER (OUTPATIENT)
Age: 41
End: 2021-04-24

## 2021-10-09 ENCOUNTER — HEALTH MAINTENANCE LETTER (OUTPATIENT)
Age: 41
End: 2021-10-09

## 2021-12-07 NOTE — PROGRESS NOTES
"HPI Comments:   I last saw the patient at the clinic on 3/28/2017.  During that clinic visit, we discussed about her depression and her attention deficit disorder. I maintained her dose of Adderall XR 20 mg in the morning and immediate release Adderall 10 mg as needed in the afternoon. This regimen continues to work well for her and she would like to stay on her her current dose.  As for her depression, the patient has only taken the Celexa for a month and has not noticed much difference. Interestingly, her PHQ9 score today is only 3. She states that she feels fatigued all the time and is wondering if she has a thyroid disorder or adrenal fatigue (both of which she read about online).      Past Medical History:   Diagnosis Date     Attention deficit disorder        Review of Systems   Constitutional: Positive for malaise/fatigue.   Psychiatric/Behavioral: Positive for depression. The patient is nervous/anxious. The patient does not have insomnia (Though she sleeps continuously through the night, she wakes up feeling that she has not rested).        /80  Pulse 70  Temp 98.2  F (36.8  C)  Ht 5' 4\" (1.626 m)  Wt 159 lb (72.1 kg)  LMP 06/05/2017  SpO2 98%  Breastfeeding? No  BMI 27.29 kg/m2      Physical Exam   Constitutional: She is oriented to person, place, and time. No distress.   Neck: No thyromegaly present.   Cardiovascular: Normal rate, regular rhythm and normal heart sounds.    Neurological: She is alert and oriented to person, place, and time. GCS score is 15.   Psychiatric: Mood and affect normal.   Vitals reviewed.        ICD-10-CM    1. Mild episode of recurrent major depressive disorder (H) F33.0    2. Attention deficit disorder F98.8 amphetamine-dextroamphetamine (ADDERALL XR) 20 MG per 24 hr capsule     amphetamine-dextroamphetamine (ADDERALL) 10 MG per tablet     DISCONTINUED: amphetamine-dextroamphetamine (ADDERALL XR) 20 MG per 24 hr capsule     DISCONTINUED: " [Time Spent: ___ minutes] : I have spent [unfilled] minutes of time on the encounter. amphetamine-dextroamphetamine (ADDERALL) 10 MG per tablet     DISCONTINUED: amphetamine-dextroamphetamine (ADDERALL XR) 20 MG per 24 hr capsule     DISCONTINUED: amphetamine-dextroamphetamine (ADDERALL) 10 MG per tablet   3. Fatigue, unspecified type R53.83 TSH     T4, free     T3, Free     Cortisol         Patient Instructions   Continue with Celexa and inform doctor if your depression/anxiety persists/worsens, or if you develop new symptoms or side effects from the medication.    Keep in touch through My Chart.    Follow up in 2-3 months.

## 2022-03-04 ENCOUNTER — OFFICE VISIT (OUTPATIENT)
Dept: FAMILY MEDICINE | Facility: CLINIC | Age: 42
End: 2022-03-04
Payer: COMMERCIAL

## 2022-03-04 VITALS
HEART RATE: 80 BPM | HEIGHT: 65 IN | OXYGEN SATURATION: 98 % | RESPIRATION RATE: 16 BRPM | WEIGHT: 168 LBS | BODY MASS INDEX: 27.99 KG/M2 | SYSTOLIC BLOOD PRESSURE: 116 MMHG | TEMPERATURE: 97.2 F | DIASTOLIC BLOOD PRESSURE: 80 MMHG

## 2022-03-04 DIAGNOSIS — F98.8 ATTENTION DEFICIT DISORDER, UNSPECIFIED HYPERACTIVITY PRESENCE: ICD-10-CM

## 2022-03-04 PROCEDURE — 99213 OFFICE O/P EST LOW 20 MIN: CPT | Performed by: INTERNAL MEDICINE

## 2022-03-04 ASSESSMENT — ANXIETY QUESTIONNAIRES
3. WORRYING TOO MUCH ABOUT DIFFERENT THINGS: SEVERAL DAYS
2. NOT BEING ABLE TO STOP OR CONTROL WORRYING: SEVERAL DAYS
1. FEELING NERVOUS, ANXIOUS, OR ON EDGE: SEVERAL DAYS
GAD7 TOTAL SCORE: 3
IF YOU CHECKED OFF ANY PROBLEMS ON THIS QUESTIONNAIRE, HOW DIFFICULT HAVE THESE PROBLEMS MADE IT FOR YOU TO DO YOUR WORK, TAKE CARE OF THINGS AT HOME, OR GET ALONG WITH OTHER PEOPLE: SOMEWHAT DIFFICULT
5. BEING SO RESTLESS THAT IT IS HARD TO SIT STILL: NOT AT ALL
7. FEELING AFRAID AS IF SOMETHING AWFUL MIGHT HAPPEN: NOT AT ALL
6. BECOMING EASILY ANNOYED OR IRRITABLE: NOT AT ALL

## 2022-03-04 ASSESSMENT — PATIENT HEALTH QUESTIONNAIRE - PHQ9
5. POOR APPETITE OR OVEREATING: NOT AT ALL
SUM OF ALL RESPONSES TO PHQ QUESTIONS 1-9: 4

## 2022-03-04 NOTE — PROGRESS NOTES
"  Assessment & Plan     Attention deficit disorder, unspecified hyperactivity presence  Historically was on stimulant for ADD.   She is interested in going back on this.   Discussed risk/benefit profile.   Does have hx of mild anxiety/depression but states in control with therapy.  Discussed option for non-stimulant such as wellbutrin which may help with concentration, as well.  She declines and prefers stimulant.   Will refer to community mental health providers (options include but are not limited to [facility] & [facility]) for initiation and management if indicated and appropriate.  She is agreeable to plan.      Return if symptoms worsen or fail to improve.    Melanie Booker DO  [facility] MEAGAN Caceres is a 41 year old who presents for the following health issues     HPI     Establish care    Former PCP: claudia  Specialists: gyn  Problem list and medications reviewed and updated. See below for additional notes.  Social: nonsmoker, occasion etoh, no drugs  HM: Due for covid booster, defers, plans to do through pharmacy    This is a pleasant 40 yo female who presents to discuss ADD.  Currently not on prescription medications.   States hx of ADD. Stopped stimulants while pregnant. 2 months ago had second child. Going back to work soon and nervous about concentration, wants to go back on adderall.   Mild depression and anxiety in the past, was on lexapro. States does not want antidepressant or anti anxiety medication, feels she is doing ok with counseling.      Review of Systems   Constitutional, HEENT, cardiovascular, pulmonary, gi and gu systems are negative, except as otherwise noted.      Objective    /80 (BP Location: Right arm, Patient Position: Sitting, Cuff Size: Adult Regular)   Pulse 80   Temp 97.2  F (36.2  C) (Temporal)   Resp 16   Ht 1.638 m (5' 4.5\")   Wt 76.2 kg (168 lb)   SpO2 98%   BMI 28.39 kg/m    Body mass index is 28.39 " kg/m .  Physical Exam     GENERAL APPEARANCE: AAOx3, no distress. Well developed.    PSYCH: appropriate mood and affect.

## 2022-03-05 ASSESSMENT — ANXIETY QUESTIONNAIRES: GAD7 TOTAL SCORE: 3

## 2022-03-27 ENCOUNTER — LAB (OUTPATIENT)
Dept: LAB | Facility: CLINIC | Age: 42
End: 2022-03-27
Payer: COMMERCIAL

## 2022-03-27 DIAGNOSIS — Z79.899 HIGH RISK MEDICATIONS (NOT ANTICOAGULANTS) LONG-TERM USE: ICD-10-CM

## 2022-03-27 DIAGNOSIS — Z79.899 HIGH RISK MEDICATIONS (NOT ANTICOAGULANTS) LONG-TERM USE: Primary | ICD-10-CM

## 2022-03-27 LAB
ETHANOL UR QL SCN: NORMAL
METHADONE UR QL SCN: NORMAL
OXYCODONE UR QL: NORMAL

## 2022-03-27 PROCEDURE — 80320 DRUG SCREEN QUANTALCOHOLS: CPT

## 2022-03-27 PROCEDURE — 80307 DRUG TEST PRSMV CHEM ANLYZR: CPT

## 2022-03-28 LAB
AMPHETAMINES UR QL SCN: NORMAL
BARBITURATES UR QL: NORMAL
BENZODIAZ UR QL: NORMAL
CANNABINOIDS UR QL SCN: NORMAL
COCAINE UR QL: NORMAL
OPIATES UR QL SCN: NORMAL
PCP UR QL SCN: NORMAL

## 2022-05-21 ENCOUNTER — HEALTH MAINTENANCE LETTER (OUTPATIENT)
Age: 42
End: 2022-05-21

## 2022-09-11 ENCOUNTER — HEALTH MAINTENANCE LETTER (OUTPATIENT)
Age: 42
End: 2022-09-11

## 2023-06-03 ENCOUNTER — HEALTH MAINTENANCE LETTER (OUTPATIENT)
Age: 43
End: 2023-06-03

## 2023-10-07 ENCOUNTER — HEALTH MAINTENANCE LETTER (OUTPATIENT)
Age: 43
End: 2023-10-07

## 2024-07-13 ENCOUNTER — HEALTH MAINTENANCE LETTER (OUTPATIENT)
Age: 44
End: 2024-07-13